# Patient Record
Sex: MALE | Race: WHITE | Employment: OTHER | URBAN - METROPOLITAN AREA
[De-identification: names, ages, dates, MRNs, and addresses within clinical notes are randomized per-mention and may not be internally consistent; named-entity substitution may affect disease eponyms.]

---

## 2020-08-12 ENCOUNTER — OFFICE VISIT (OUTPATIENT)
Dept: INFECTIOUS DISEASES | Age: 70
End: 2020-08-12
Payer: COMMERCIAL

## 2020-08-12 VITALS
RESPIRATION RATE: 18 BRPM | OXYGEN SATURATION: 99 % | HEART RATE: 66 BPM | WEIGHT: 150 LBS | DIASTOLIC BLOOD PRESSURE: 55 MMHG | HEIGHT: 66 IN | BODY MASS INDEX: 24.11 KG/M2 | SYSTOLIC BLOOD PRESSURE: 109 MMHG | TEMPERATURE: 97.5 F

## 2020-08-12 PROCEDURE — 99203 OFFICE O/P NEW LOW 30 MIN: CPT | Performed by: INTERNAL MEDICINE

## 2020-08-12 RX ORDER — ATORVASTATIN CALCIUM 80 MG/1
TABLET, FILM COATED ORAL
COMMUNITY
Start: 2020-06-24

## 2020-08-12 RX ORDER — PNV NO.95/FERROUS FUM/FOLIC AC 28MG-0.8MG
TABLET ORAL
COMMUNITY
Start: 2020-06-27

## 2020-08-12 RX ORDER — HYDROCODONE BITARTRATE AND ACETAMINOPHEN 5; 325 MG/1; MG/1
TABLET ORAL
COMMUNITY
Start: 2020-08-10

## 2020-08-12 RX ORDER — CARVEDILOL 6.25 MG/1
TABLET ORAL
COMMUNITY
Start: 2020-06-01

## 2020-08-12 RX ORDER — TICAGRELOR 90 MG/1
TABLET ORAL
COMMUNITY
Start: 2020-06-07

## 2020-08-12 RX ORDER — INSULIN DETEMIR 100 [IU]/ML
INJECTION, SOLUTION SUBCUTANEOUS
COMMUNITY
Start: 2020-06-05

## 2020-08-12 RX ORDER — VALACYCLOVIR HYDROCHLORIDE 1 G/1
TABLET, FILM COATED ORAL
COMMUNITY
Start: 2020-08-10

## 2020-08-12 RX ORDER — ISOSORBIDE MONONITRATE 30 MG/1
TABLET, EXTENDED RELEASE ORAL
COMMUNITY
Start: 2020-06-07

## 2020-08-12 RX ORDER — FUROSEMIDE 40 MG/1
TABLET ORAL
COMMUNITY
Start: 2020-06-07

## 2020-08-12 ASSESSMENT — ENCOUNTER SYMPTOMS
ALLERGIC/IMMUNOLOGIC NEGATIVE: 1
RESPIRATORY NEGATIVE: 1
GASTROINTESTINAL NEGATIVE: 1

## 2020-08-12 NOTE — PROGRESS NOTES
[facility]   Office Consult Note  Today's Date and Time: 8/12/2020, 10:15 AM    Impression:     1. Dry gangrene ([address]    2. Toe ulcer, left, with unspecified severity ([address]    3. Osteomyelitis of toe of left foot ([facility])         Recommendations   · The patient has dry gangrenous changes in the right great toe and I would agree that amputation will be the way to go. · The MRI suggest osteomyelitis in the phalanges of that toe. · The patient has a left great toe ulceration in the distal phalanx is visible in the wound bed and by definition this is osteomyelitis as well  · I have discussed with the patient that at this point in time there is no evidence of an acute infection and that the osteomyelitis that he has is related to the severe peripheral arterial disease and gangrenous changes  · The patient has dry gangrene and therefore antimicrobial therapy is not skin to be of any benefit  · I would agree with the plans for right great toe amputation and I think the patient would need at least distal phalanx amputation on the left as well  · The [facility] for surgical planning  · I will see him in on an as-needed basis if the need for antibiotics would arise post surgery if there is any secondary soft tissue infection  · By definition the osteomyelitis should be treated with the amputations alone given that the infected bone will be removed in its entirety. I have ordered the following medications/ labs:  No orders of the defined types were placed in this encounter. No orders of the defined types were placed in this encounter.       Chief complaint/reason for consultation:     Chief Complaint   Patient presents with    Osteomyelitis         History of Present Illness:   Parul Edmonds is a 72y.o.-year-old male who is seen at there request of Kenton Norris MD.  Lizeth Atkins is a 60-year-old male with a longstanding history of diabetes mellitus, peripheral arterial disease, hypertension, chronic kidney disease stage III, congestive heart failure, ischemic cardiomyopathy. 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 The patient has previously had attempts at revascularization in the right lower extremity and at this point in time the patient's disease has been deemed non-reconstructable. The patient has been at the wound healing center at [facility] getting hyperbaric oxygen treatment for limb salvage. The patient had a thick curled toenail cutting into the skin causing a wound on the tip of the left great toe that was debrided by his podiatrist and the wound has been nonhealing. The patient has dry gangrene in the great toes of both his feet and there is been a plan for him to undergo right great toe amputation and left great toe debridement. His hyperbaric oxygen treatments have been on hold until the amputations done then they will be resumed to help with wound healing. The patient did have an MRI done July 22, 2020 that showed a skin defect over the terminal tuft of the right great toe. There were changes of osteomyelitis affecting the right first distal phalanx as well as the distal aspect of the proximal phalanx. There was no evidence for septic arthritis of the interphalangeal joint. The patient was asked to see me for the osteomyelitis. The patient does also report that recently over the weekend he developed pain in the right back and abdomen and was diagnosed with shingles and he is on Valtrex and also taking an oral narcotic for pain relief. I have personally reviewed the past medical history,past surgical history, medications, social history, and family history, and I have updated the database accordingly.   PastMedical History:     Past Medical History:   Diagnosis Date    CHF (congestive heart failure) (HCC)     High blood pressure     Kidney disease     Shingles      Past Surgical History:   History reviewed. No pertinent surgical history. Medications:     Current Outpatient Medications   Medication Sig Dispense Refill    atorvastatin (LIPITOR) 80 MG tablet TAKE 1 TABLET BY MOUTH EVERY DAY      carvedilol (COREG) 6.25 MG tablet TAKE 1 TABLET BY MOUTH TWICE A DAY      Ferrous Sulfate (IRON) 325 (65 Fe) MG TABS TAKE 1 TABLET BY MOUTH TWICE DAILY FOR 30 DAYS      furosemide (LASIX) 40 MG tablet TAKE 1 TABLET BY MOUTH EVERY DAY      HYDROcodone-acetaminophen (NORCO) 5-325 MG per tablet       LEVEMIR FLEXTOUCH 100 UNIT/ML injection pen INJECT 10 UNITS SUB Q AT BEDTIME      isosorbide mononitrate (IMDUR) 30 MG extended release tablet TAKE 1 TABLET BY MOUTH EVERY DAY IN THE MORNING      BRILINTA 90 MG TABS tablet TAKE 1 TABLET BY MOUTH TWICE A DAY      valACYclovir (VALTREX) 1 g tablet        No current facility-administered medications for this visit.        Social History:     Social History     Socioeconomic History    Marital status: Unknown     Spouse name: Not on file    Number of children: Not on file    Years of education: Not on file    Highest education level: Not on file   Occupational History    Not on file   Social Needs    Financial resource strain: Not on file    Food insecurity     Worry: Not on file     Inability: Not on file    Transportation needs     Medical: Not on file     Non-medical: Not on file   Tobacco Use    Smoking status: Never Smoker    Smokeless tobacco: Never Used   Substance and Sexual Activity    Alcohol use: Not on file    Drug use: Not on file    Sexual activity: Not on file   Lifestyle    Physical activity     Days per week: Not on file     Minutes per session: Not on file    Stress: Not on file   Relationships    Social connections     Talks on phone: Not on file     Gets together: Not on file     Attends [employer] service: Not on file     Active member of club or organization: Not on file     Attends meetings of clubs or organizations: Right-sided T9 dermatome vesicular rash consistent with varicella-zoster virus  Right great toe dry gangrene as depicted in the pictures below. Left great toe ulceration at the tip with exposed bone in the wound bed   Neurological:      Mental Status: He is alert and oriented to person, place, and time. Medical Decision Making:   I haveindependently reviewed the following labs:  CBC with Differential:No results found for: WBC, HGB, HCT, PLT, SEGSPCT, BANDSPCT, LYMPHOPCT, MONOPCT, EOSPCT  BMP:No results found for: NA, K, CL, CO2, BUN, CREATININE, MG  Hepatic Function Panel: No results found for: PROT, LABALBU, BILIDIR, IBILI, BILITOT, ALKPHOS, ALT, AST  No results found for: CRP  No results found for: SEDRATE      Imaging Studies:   MRI results as noted above in the HPI    Thank you for allowing us to participate in the care of this patient. Pleasecall with questions. Dhiraj Turner MD  Perfect [employer] messaging: [phone]    This note is created with the assistance of a speech recognition program.  While intending to generate a document that actually reflects the content ofthe visit, the document can still have some errors including those of syntax and sound a like substitutions which may escape proof reading. It such instances, actual meaning can be extrapolated by contextual diversion.

## 2020-08-17 ENCOUNTER — TELEPHONE (OUTPATIENT)
Dept: INFECTIOUS DISEASES | Age: 70
End: 2020-08-17

## 2021-01-11 ENCOUNTER — HOSPITAL ENCOUNTER (OUTPATIENT)
Age: 71
Setting detail: SPECIMEN
Discharge: HOME | End: 2021-01-11
Payer: MEDICARE

## 2021-01-11 LAB
ABSOLUTE EOS #: 0.14 K/UL (ref 0–0.44)
ABSOLUTE IMMATURE GRANULOCYTE: <0.03 K/UL (ref 0–0.3)
ABSOLUTE LYMPH #: 1.21 K/UL (ref 1.1–3.7)
ABSOLUTE MONO #: 0.46 K/UL (ref 0.1–1.2)
ANION GAP SERPL CALCULATED.3IONS-SCNC: 11 MMOL/L (ref 9–17)
BASOPHILS # BLD: 1 % (ref 0–2)
BASOPHILS ABSOLUTE: 0.04 K/UL (ref 0–0.2)
BUN BLDV-MCNC: 59 MG/DL (ref 8–23)
BUN/CREAT BLD: ABNORMAL (ref 9–20)
CALCIUM SERPL-MCNC: 9.1 MG/DL (ref 8.6–10.4)
CHLORIDE BLD-SCNC: 96 MMOL/L (ref 98–107)
CO2: 28 MMOL/L (ref 20–31)
CREAT SERPL-MCNC: 1.58 MG/DL (ref 0.7–1.2)
DIFFERENTIAL TYPE: ABNORMAL
EOSINOPHILS RELATIVE PERCENT: 2 % (ref 1–4)
GFR AFRICAN AMERICAN: 53 ML/MIN
GFR NON-AFRICAN AMERICAN: 44 ML/MIN
GFR SERPL CREATININE-BSD FRML MDRD: ABNORMAL ML/MIN/{1.73_M2}
GFR SERPL CREATININE-BSD FRML MDRD: ABNORMAL ML/MIN/{1.73_M2}
GLUCOSE BLD-MCNC: 183 MG/DL (ref 70–99)
HCT VFR BLD CALC: 27 % (ref 40.7–50.3)
HEMOGLOBIN: 8.5 G/DL (ref 13–17)
IMMATURE GRANULOCYTES: 0 %
LYMPHOCYTES # BLD: 16 % (ref 24–43)
MAGNESIUM: 2.2 MG/DL (ref 1.6–2.6)
MCH RBC QN AUTO: 30.6 PG (ref 25.2–33.5)
MCHC RBC AUTO-ENTMCNC: 31.5 G/DL (ref 28.4–34.8)
MCV RBC AUTO: 97.1 FL (ref 82.6–102.9)
MONOCYTES # BLD: 6 % (ref 3–12)
NRBC AUTOMATED: 0 PER 100 WBC
PDW BLD-RTO: 17 % (ref 11.8–14.4)
PLATELET # BLD: 225 K/UL (ref 138–453)
PLATELET ESTIMATE: ABNORMAL
PMV BLD AUTO: 11.4 FL (ref 8.1–13.5)
POTASSIUM SERPL-SCNC: 4.6 MMOL/L (ref 3.7–5.3)
RBC # BLD: 2.78 M/UL (ref 4.21–5.77)
RBC # BLD: ABNORMAL 10*6/UL
SEG NEUTROPHILS: 75 % (ref 36–65)
SEGMENTED NEUTROPHILS ABSOLUTE COUNT: 5.92 K/UL (ref 1.5–8.1)
SODIUM BLD-SCNC: 135 MMOL/L (ref 135–144)
WBC # BLD: 7.8 K/UL (ref 3.5–11.3)
WBC # BLD: ABNORMAL 10*3/UL

## 2021-02-18 ENCOUNTER — HOSPITAL ENCOUNTER (EMERGENCY)
Age: 71
Discharge: HOME | End: 2021-02-18
Attending: EMERGENCY MEDICINE
Payer: MEDICARE

## 2021-02-18 VITALS
HEART RATE: 92 BPM | SYSTOLIC BLOOD PRESSURE: 175 MMHG | DIASTOLIC BLOOD PRESSURE: 78 MMHG | TEMPERATURE: 98.4 F | OXYGEN SATURATION: 100 % | RESPIRATION RATE: 16 BRPM | WEIGHT: 135 LBS | BODY MASS INDEX: 21.79 KG/M2

## 2021-02-18 DIAGNOSIS — T87.9 COMPLICATION OF AMPUTATION STUMP OF LEFT LOWER EXTREMITY (HCC): Primary | ICD-10-CM

## 2021-02-18 DIAGNOSIS — D64.89 OTHER SPECIFIED ANEMIAS: ICD-10-CM

## 2021-02-18 LAB
ABSOLUTE EOS #: 0.11 K/UL (ref 0–0.44)
ABSOLUTE IMMATURE GRANULOCYTE: 0.14 K/UL (ref 0–0.3)
ABSOLUTE LYMPH #: 1.56 K/UL (ref 1.1–3.7)
ABSOLUTE MONO #: 0.62 K/UL (ref 0.1–1.2)
BASOPHILS # BLD: 0 % (ref 0–2)
BASOPHILS ABSOLUTE: 0.05 K/UL (ref 0–0.2)
DIFFERENTIAL TYPE: ABNORMAL
EOSINOPHILS RELATIVE PERCENT: 1 % (ref 1–4)
HCT VFR BLD CALC: 20.6 % (ref 40.7–50.3)
HCT VFR BLD CALC: 24.9 % (ref 40.7–50.3)
HEMOGLOBIN: 6.8 G/DL (ref 13–17)
HEMOGLOBIN: 8.3 G/DL (ref 13–17)
IMMATURE GRANULOCYTES: 1 %
LYMPHOCYTES # BLD: 13 % (ref 24–43)
MCH RBC QN AUTO: 31.5 PG (ref 25.2–33.5)
MCHC RBC AUTO-ENTMCNC: 33 G/DL (ref 28.4–34.8)
MCV RBC AUTO: 95.4 FL (ref 82.6–102.9)
MONOCYTES # BLD: 5 % (ref 3–12)
NRBC AUTOMATED: 0 PER 100 WBC
PDW BLD-RTO: 13.3 % (ref 11.8–14.4)
PLATELET # BLD: 378 K/UL (ref 138–453)
PLATELET ESTIMATE: ABNORMAL
PMV BLD AUTO: 8.8 FL (ref 8.1–13.5)
RBC # BLD: 2.16 M/UL (ref 4.21–5.77)
RBC # BLD: ABNORMAL 10*6/UL
SEG NEUTROPHILS: 79 % (ref 36–65)
SEGMENTED NEUTROPHILS ABSOLUTE COUNT: 9.24 K/UL (ref 1.5–8.1)
WBC # BLD: 11.7 K/UL (ref 3.5–11.3)
WBC # BLD: ABNORMAL 10*3/UL

## 2021-02-18 PROCEDURE — 86850 RBC ANTIBODY SCREEN: CPT

## 2021-02-18 PROCEDURE — 99283 EMERGENCY DEPT VISIT LOW MDM: CPT

## 2021-02-18 PROCEDURE — 96375 TX/PRO/DX INJ NEW DRUG ADDON: CPT

## 2021-02-18 PROCEDURE — 85018 HEMOGLOBIN: CPT

## 2021-02-18 PROCEDURE — 85014 HEMATOCRIT: CPT

## 2021-02-18 PROCEDURE — 85025 COMPLETE CBC W/AUTO DIFF WBC: CPT

## 2021-02-18 PROCEDURE — 6360000002 HC RX W HCPCS: Performed by: STUDENT IN AN ORGANIZED HEALTH CARE EDUCATION/TRAINING PROGRAM

## 2021-02-18 PROCEDURE — 96374 THER/PROPH/DIAG INJ IV PUSH: CPT

## 2021-02-18 PROCEDURE — 36430 TRANSFUSION BLD/BLD COMPNT: CPT

## 2021-02-18 PROCEDURE — 86901 BLOOD TYPING SEROLOGIC RH(D): CPT

## 2021-02-18 PROCEDURE — 86900 BLOOD TYPING SEROLOGIC ABO: CPT

## 2021-02-18 PROCEDURE — 2500000003 HC RX 250 WO HCPCS: Performed by: STUDENT IN AN ORGANIZED HEALTH CARE EDUCATION/TRAINING PROGRAM

## 2021-02-18 PROCEDURE — P9016 RBC LEUKOCYTES REDUCED: HCPCS

## 2021-02-18 PROCEDURE — 86920 COMPATIBILITY TEST SPIN: CPT

## 2021-02-18 PROCEDURE — 96376 TX/PRO/DX INJ SAME DRUG ADON: CPT

## 2021-02-18 RX ORDER — FENTANYL CITRATE 50 UG/ML
50 INJECTION, SOLUTION INTRAMUSCULAR; INTRAVENOUS PRN
Status: COMPLETED | OUTPATIENT
Start: 2021-02-18 | End: 2021-02-18

## 2021-02-18 RX ORDER — FENTANYL CITRATE 50 UG/ML
50 INJECTION, SOLUTION INTRAMUSCULAR; INTRAVENOUS ONCE
Status: COMPLETED | OUTPATIENT
Start: 2021-02-18 | End: 2021-02-18

## 2021-02-18 RX ORDER — MORPHINE SULFATE 4 MG/ML
4 INJECTION, SOLUTION INTRAMUSCULAR; INTRAVENOUS ONCE
Status: COMPLETED | OUTPATIENT
Start: 2021-02-18 | End: 2021-02-18

## 2021-02-18 RX ORDER — LIDOCAINE HYDROCHLORIDE 10 MG/ML
30 INJECTION, SOLUTION EPIDURAL; INFILTRATION; INTRACAUDAL; PERINEURAL ONCE
Status: COMPLETED | OUTPATIENT
Start: 2021-02-18 | End: 2021-02-18

## 2021-02-18 RX ORDER — SODIUM CHLORIDE 9 MG/ML
INJECTION, SOLUTION INTRAVENOUS PRN
Status: DISCONTINUED | OUTPATIENT
Start: 2021-02-18 | End: 2021-02-18 | Stop reason: HOSPADM

## 2021-02-18 RX ADMIN — LIDOCAINE HYDROCHLORIDE 30 ML: 10 INJECTION, SOLUTION EPIDURAL; INFILTRATION; INTRACAUDAL; PERINEURAL at 13:25

## 2021-02-18 RX ADMIN — FENTANYL CITRATE 50 MCG: 50 INJECTION, SOLUTION INTRAMUSCULAR; INTRAVENOUS at 16:00

## 2021-02-18 RX ADMIN — FENTANYL CITRATE 50 MCG: 50 INJECTION, SOLUTION INTRAMUSCULAR; INTRAVENOUS at 14:52

## 2021-02-18 RX ADMIN — FENTANYL CITRATE 50 MCG: 50 INJECTION, SOLUTION INTRAMUSCULAR; INTRAVENOUS at 14:39

## 2021-02-18 RX ADMIN — MORPHINE SULFATE 4 MG: 4 INJECTION INTRAVENOUS at 16:14

## 2021-02-18 RX ADMIN — FENTANYL CITRATE 50 MCG: 50 INJECTION, SOLUTION INTRAMUSCULAR; INTRAVENOUS at 13:24

## 2021-02-18 ASSESSMENT — ENCOUNTER SYMPTOMS
ABDOMINAL PAIN: 0
SHORTNESS OF BREATH: 0
BACK PAIN: 0

## 2021-02-18 ASSESSMENT — PAIN SCALES - GENERAL
PAINLEVEL_OUTOF10: 7
PAINLEVEL_OUTOF10: 7

## 2021-02-18 NOTE — ED NOTES
Dr. Gallego Adas at bedside to repair wound on patients left leg amputation site.       Chris Harrington RN  02/18/21 3060

## 2021-02-18 NOTE — CONSULTS
[address]      Patient's Name/ Date of Birth/ Gender: Zunilda Sung. / 1950 (72 y.o.) / male     Referring Physician: Lauren Ramirez MD    Consulting Physician: Dr. Carmine Faith    History of present Illness: Pt is a 72 y.o. male who presents to [facility]. [facility] ED today with persistent bleeding to left stump site post-fall. Patient denies hitting his head or losing consciousness during the fall. Patient has history of b/l BKA amputations. Patient states that two days ago he fell onto his stumps while transferring which caused the left stump to open. Patient's wife states the left stump has sero-sanguinous drainage that has not stopped since the fall. Patient denies any purulent drainage from either stump. Patient reports he is diabetic and is taking daily Aspirin & Brilinta. Past Medical History:  has a past medical history of CHF (congestive heart failure) ([address] High blood pressure, Kidney disease, and Shingles. Past Surgical History: No past surgical history on file. Social History:  reports that he has never smoked. He has never used smokeless tobacco.    Family History: family history is not on file. Allergies: Patient has no known allergies.     Current Meds:  Current Facility-Administered Medications:     0.9 % sodium chloride infusion, , Intravenous, PRN, Man Uribe MD    Current Outpatient Medications:     atorvastatin (LIPITOR) 80 MG tablet, TAKE 1 TABLET BY MOUTH EVERY DAY, Disp: , Rfl:     carvedilol (COREG) 6.25 MG tablet, TAKE 1 TABLET BY MOUTH TWICE A DAY, Disp: , Rfl:     Ferrous Sulfate (IRON) 325 (65 Fe) MG TABS, TAKE 1 TABLET BY MOUTH TWICE DAILY FOR 30 DAYS, Disp: , Rfl:     furosemide (LASIX) 40 MG tablet, TAKE 1 TABLET BY MOUTH EVERY DAY, Disp: , Rfl:     HYDROcodone-acetaminophen (NORCO) 5-325 MG per tablet, , Disp: , Rfl:     LEVEMIR FLEXTOUCH 100 UNIT/ML injection pen, INJECT 10 UNITS SUB Q AT BEDTIME, Disp: , Rfl:   isosorbide mononitrate (IMDUR) 30 MG extended release tablet, TAKE 1 TABLET BY MOUTH EVERY DAY IN THE MORNING, Disp: , Rfl:     BRILINTA 90 MG TABS tablet, TAKE 1 TABLET BY MOUTH TWICE A DAY, Disp: , Rfl:     valACYclovir (VALTREX) 1 g tablet, , Disp: , Rfl:     REVIEW OF SYSTEMS:      Review of Systems - General ROS: negative for - chills, fatigue, fever or night sweats  Psychological ROS: negative for - anxiety, behavioral disorder or depression  Ophthalmic ROS: negative for - blurry vision, dry eyes or eye pain  ENT ROS: negative for - epistaxis, headaches or nasal congestion  Hematological and Lymphatic ROS:  negative for - bleeding problems, blood clots or bruising  Endocrine ROS: negative for - malaise/lethargy, mood swings or palpitations  Respiratory ROS: negative for - cough, hemoptysis or shortness of breath  Cardiovascular ROS: no chest pain or dyspnea on exertion  Gastrointestinal ROS: no abdominal pain, constipation, hematochezia  Genito-Urinary ROS: no dysuria, trouble voiding, or hematuria  Musculoskeletal ROS: negative for - joint pain, joint swelling. Positive for muscle pain to LLE. Positive for open wound to LLE.    Neurological ROS: Negative for TIA or stroke like symptom  Dermatological ROS: negative for dry skin and eczema    PHYSICAL EXAM:    VITALS:  BP (!) 149/68   Pulse 75   Temp 98.6 °F (37 °C) (Oral)   Resp 14   Wt 135 lb (61.2 kg)   SpO2 100%   BMI 21.79 kg/m²   INTAKE/OUTPUT:   No intake or output data in the 24 hours ending 02/18/21 1432      CONSTITUTIONAL:  Alert and oriented, no acute distress  HEAD: normocephalic, atraumatic  EYES: Pupils equal and reactive to light, Extraocular muscles intact, sclera non icteric  ENT: Mucus membranes moist, No otorrhea, no rhinorrhea  NECK:  supple, symmetrical, trachea midline   LUNGS:  Good air movement bilaterally, unlabored respirations, no wheezes or rhonchi  CARDIOVASCULAR: Regular rate and rhythm ABDOMEN: soft, non tender, non distended, no rebound or guarding  : Deferred   Rectal:  Deferred   MUSCULOSKELETAL:  Equal strength bilaterally. S/p b/l BKA  SKIN: No abscess or rash. Open wound with exposed tibia to left stump. Ext: Palpable popliteal pulse b/l   NEUROLOGIC: no focal deficits  PSYCH: affect appropriate      Labs:   Lab Results   Component Value Date    WBC 11.7 02/18/2021    HGB 6.8 02/18/2021    HCT 20.6 02/18/2021    MCV 95.4 02/18/2021     02/18/2021     Lab Results   Component Value Date     01/11/2021    K 4.6 01/11/2021    CL 96 01/11/2021    CO2 28 01/11/2021    BUN 59 01/11/2021    CREATININE 1.58 01/11/2021    GLUCOSE 183 01/11/2021    CALCIUM 9.1 01/11/2021     No results found for: INR      Impression:  There is no problem list on file for this patient. 1. 72 y.o. male with hx of b/l BKA and open wound with exposed tibia to the left stump site     Recommendation:    1. Recommend blood transfusion for Hgb of 6.8   2. Bedside washout and closure of wound, see procedure note below. 3. Patient to perform daily dressing changes as directed until follow up with Dr. Jade Martinez  4. Patient okay to be discharged from vascular standpoint. 5. Follow up with Dr. Jade Martinez 2/22/2021. Procedure Note:   Patient understands that a bedside wound washout and closure needs to be performed in the left lower extremity. Patient agrees to procedure. Timeout performed with patient, RN, and writer in room. Attention was then directed to the LLE. Copious amounts of betadine/normal saline mixture was used to irrigate the wound site. 15cc of 1% lidocaine plain was used for a local block. The wound site was then draped in sterile fashion. The open stump site was then sutured closed in layered fashion from deep to superficial being sure to cover the exposed bone. Upon completion satisfactory closure was achieved. No purulence was noted throughout procedure. The site was then scrubbed with hibiclens and dressed with adaptic, 4x4s, Kerlix, and Ace. Hemostasis was achieved with direct pressure. There were no complications and patient reports 0/10 post procedure pain. Patient tolerated procedure well.      Electronically signed by Stewart Pennington DPM on 2/18/2021 at 4:37 PM

## 2021-02-18 NOTE — ED PROVIDER NOTES
[facility] ED  Emergency Department Encounter  Emergency Medicine Resident     Pt Name: Nichole Reid MRN: [id number]  Birthdate 1950  Date of evaluation: 2/18/21  PCP:  Lyric Ha MD    [address] [place] [address]       Chief Complaint   Patient presents with   Emerita Abler Fall     pt fell two days ago, pt hit his stump on LLE. pt with ace bandage in place at this time. pts family member states that its been bleeding since yesterday. pt denies hitting head when he fell. pt alert and oriented at this time. pt was told by his physician to come to er. pt is on aspirin and brilinta. HISTORY OFPRESENT ILLNESS  (Location/Symptom, Timing/Onset, Context/Setting, Quality, Duration, Modifying Factors,Severity.)      Nichole Reid is a 72year old male, PMH CHF, hypertension,  who presents with bleeding from his left stump. The patient had a fall 2 days ago while transitioning from his wheelchair to his bed. Patient fell forward and struck his left stump on the ground. Did not hit his head or lose consciousness. The patient has had persistent bleeding from his left stump. He was evaluated at outlying emergency department yesterday and vascular surgery was consulted. The patient had an x-ray as well. Patient was discharged home. Presents again today for persistent bleeding. The patient has home nurses who changed bandages at home. The patient takes Brilinta and aspirin. Last dose of Brilinta and aspirin this morning around 9 AM.    PAST MEDICAL / SURGICAL / SOCIAL / FAMILY HISTORY      has a past medical history of CHF (congestive heart failure) ([address] High blood pressure, Kidney disease, and Shingles. has no past surgical history on file.      Social History     Socioeconomic History    Marital status:      Spouse name: Not on file    Number of children: Not on file    Years of education: Not on file    Highest education level: Not on file   Occupational History  Not on file   Social Needs    Financial resource strain: Not on file    Food insecurity     Worry: Not on file     Inability: Not on file    Transportation needs     Medical: Not on file     Non-medical: Not on file   Tobacco Use    Smoking status: Never Smoker    Smokeless tobacco: Never Used   Substance and Sexual Activity    Alcohol use: Not on file    Drug use: Not on file    Sexual activity: Not on file   Lifestyle    Physical activity     Days per week: Not on file     Minutes per session: Not on file    Stress: Not on file   Relationships    Social connections     Talks on phone: Not on file     Gets together: Not on file     Attends [employer] service: Not on file     Active member of club or organization: Not on file     Attends meetings of clubs or organizations: Not on file     Relationship status: Not on file    Intimate partner violence     Fear of current or ex partner: Not on file     Emotionally abused: Not on file     Physically abused: Not on file     Forced sexual activity: Not on file   Other Topics Concern    Not on file   Social History Narrative    Not on file       No family history on file. Allergies:  Patient has no known allergies. Home Medications:  Prior to Admission medications    Medication Sig Start Date End Date Taking?  Authorizing Provider   atorvastatin (LIPITOR) 80 MG tablet TAKE 1 TABLET BY MOUTH EVERY DAY 6/24/20   Historical Provider, MD   carvedilol (COREG) 6.25 MG tablet TAKE 1 TABLET BY MOUTH TWICE A DAY 6/1/20   Historical Provider, MD   Ferrous Sulfate (IRON) 325 (65 Fe) MG TABS TAKE 1 TABLET BY MOUTH TWICE DAILY FOR 30 DAYS 6/27/20   Historical Provider, MD   furosemide (LASIX) 40 MG tablet TAKE 1 TABLET BY MOUTH EVERY DAY 6/7/20   Historical Provider, MD   HYDROcodone-acetaminophen ([facility]) 5-325 MG per tablet  8/10/20   Historical Provider, MD No results found. EKG      All EKG's are interpreted by the Emergency Department Physicianwho either signs or Co-signs this chart in the absence of a cardiologist.    EMERGENCY DEPARTMENT COURSE:      Hemoglobin 6.8. Will transfuse 1 unit of blood. Vascular surgery to irrigate and repair wound bedside. Vascular surgery agreeable to discharge at this time and follow-up on Monday. Will repeat hemoglobin after 1 unit. Plan to discharge if improved from 6.8. Hemoglobin improved to 8.3 after 1 unit of blood. .  The patient was instructed to follow-up with vascular surgery. PROCEDURES:  None    CONSULTS:  IP CONSULT TO VASCULAR SURGERY    CRITICAL CARE:  Please see attending note    FINAL IMPRESSION      1. Complication of amputation stump of left lower extremity (HCC)          DISPOSITION / PLAN     DISPOSITION        PATIENT REFERRED TO:  No follow-up provider specified.     DISCHARGE MEDICATIONS:  New Prescriptions    No medications on file       Nayely Rosado MD  Emergency Medicine Resident    (Please note that portions of this note were completed with a voice recognition program.Efforts were made to edit the dictations but occasionally words are mis-transcribed.)        Nayely Rosado MD  Resident  02/18/21 [id number]       Nayely Rosado MD  Resident  02/18/21 [id number]

## 2021-02-18 NOTE — ED PROVIDER NOTES
[facility] [address] ED     Emergency Department     Faculty Attestation        I performed a history and physical examination of the patient and discussed management with the resident. I reviewed the residents note and agree with the documented findings and plan of care. Any areas of disagreement are noted on the chart. I was personally present for the key portions of any procedures. I have documented in the chart those procedures where I was not present during the key portions. I have reviewed the emergency nurses triage note. I agree with the chief complaint, past medical history, past surgical history, allergies, medications, social and family history as documented unless otherwise noted below. For Physician Assistant/ Nurse Practitioner cases/documentation I have personally evaluated this patient and have completed at least one if not all key elements of the E/M (history, physical exam, and MDM). Additional findings are as noted. Vital Signs: BP: (!) 136/58  Pulse: 84  Resp: 14  Temp: 99 °F (37.2 °C) SpO2: 100 %  PCP:  Laura Campbell MD    Pertinent Comments:     Patient is a 50-year-old male with bilateral lower extremity amputations. Patient fell yesterday onto his left 1 with ruptured incision line and intermittent oozing and bleeding. Here appears to have large wound that has been reopened. Some mild oozing. No obvious signs of infection at this time. Assessment/plan: Reopening of incision for previous amputation and well obtain CBC given baseline hemoglobin 8.5 and significant bleeding yesterday.    Also consult vascular surgery who perform the procedure    Critical Care  None This patient was evaluated in the Emergency Department for symptoms described in the history of present illness. He/she was evaluated in the context of the global COVID-19 pandemic, which necessitated consideration that the patient might be at risk for infection with the SARS-CoV-2 virus that causes COVID-19. Institutional protocols and algorithms that pertain to the evaluation of patients at risk for COVID-19 are in a state of rapid change based on information released by regulatory bodies including the CDC and federal and state organizations. These policies and algorithms were followed during the patient's care in the ED. (Please note that portions of this note were completed with a voice recognition program. Efforts were made to edit the dictations but occasionally words are mis-transcribed.  Whenever words are used in this note in any gender, they shall be construed as though they were used in the gender appropriate to the circumstances; and whenever words are used in this note in the singular or plural form, they shall be construed as though they were used in the form appropriate to the circumstances.)    Jose Del aVlle MD [facility]  Attending Emergency Medicine Physician           Fish Chandler MD  02/18/21 [address] MATHEWS MD  02/18/21 [id number]

## 2021-02-19 LAB
ABO/RH: NORMAL
ANTIBODY SCREEN: NEGATIVE
ARM BAND NUMBER: NORMAL
BLD PROD TYP BPU: NORMAL
CROSSMATCH RESULT: NORMAL
DISPENSE STATUS BLOOD BANK: NORMAL
EXPIRATION DATE: NORMAL
TRANSFUSION STATUS: NORMAL
UNIT DIVISION: 0
UNIT NUMBER: NORMAL

## 2023-04-24 ENCOUNTER — HOSPITAL ENCOUNTER (OUTPATIENT)
Dept: ONCOLOGY | Age: 73
Discharge: HOME | End: 2023-04-24

## 2023-06-20 ENCOUNTER — OFFICE VISIT (OUTPATIENT)
Age: 73
End: 2023-06-20
Payer: MEDICARE

## 2023-06-20 VITALS
SYSTOLIC BLOOD PRESSURE: 137 MMHG | WEIGHT: 154.7 LBS | HEIGHT: 62 IN | TEMPERATURE: 97.5 F | BODY MASS INDEX: 28.47 KG/M2 | DIASTOLIC BLOOD PRESSURE: 59 MMHG | HEART RATE: 73 BPM | RESPIRATION RATE: 14 BRPM

## 2023-06-20 VITALS — WEIGHT: 155 LBS | BODY MASS INDEX: 25.02 KG/M2

## 2023-06-20 DIAGNOSIS — Z89.511 HX OF BKA, RIGHT (HCC): ICD-10-CM

## 2023-06-20 DIAGNOSIS — Z78.9 NONSMOKER: ICD-10-CM

## 2023-06-20 DIAGNOSIS — Z89.512 HX OF BKA, LEFT (HCC): ICD-10-CM

## 2023-06-20 DIAGNOSIS — N18.5 TYPE 2 DIABETES MELLITUS WITH STAGE 5 CHRONIC KIDNEY DISEASE NOT ON CHRONIC DIALYSIS, WITH LONG-TERM CURRENT USE OF INSULIN (HCC): Primary | ICD-10-CM

## 2023-06-20 DIAGNOSIS — N18.5 CKD STAGE 5 DUE TO TYPE 2 DIABETES MELLITUS (HCC): ICD-10-CM

## 2023-06-20 DIAGNOSIS — I10 PRIMARY HYPERTENSION: ICD-10-CM

## 2023-06-20 DIAGNOSIS — E11.22 TYPE 2 DIABETES MELLITUS WITH STAGE 5 CHRONIC KIDNEY DISEASE NOT ON CHRONIC DIALYSIS, WITH LONG-TERM CURRENT USE OF INSULIN (HCC): Primary | ICD-10-CM

## 2023-06-20 DIAGNOSIS — E78.2 MIXED HYPERLIPIDEMIA: ICD-10-CM

## 2023-06-20 DIAGNOSIS — Z79.4 TYPE 2 DIABETES MELLITUS WITH STAGE 5 CHRONIC KIDNEY DISEASE NOT ON CHRONIC DIALYSIS, WITH LONG-TERM CURRENT USE OF INSULIN (HCC): Primary | ICD-10-CM

## 2023-06-20 DIAGNOSIS — E11.22 CKD STAGE 5 DUE TO TYPE 2 DIABETES MELLITUS (HCC): ICD-10-CM

## 2023-06-20 PROCEDURE — 1123F ACP DISCUSS/DSCN MKR DOCD: CPT | Performed by: STUDENT IN AN ORGANIZED HEALTH CARE EDUCATION/TRAINING PROGRAM

## 2023-06-20 PROCEDURE — 3074F SYST BP LT 130 MM HG: CPT | Performed by: STUDENT IN AN ORGANIZED HEALTH CARE EDUCATION/TRAINING PROGRAM

## 2023-06-20 PROCEDURE — 3078F DIAST BP <80 MM HG: CPT | Performed by: STUDENT IN AN ORGANIZED HEALTH CARE EDUCATION/TRAINING PROGRAM

## 2023-06-20 PROCEDURE — 99214 OFFICE O/P EST MOD 30 MIN: CPT | Performed by: STUDENT IN AN ORGANIZED HEALTH CARE EDUCATION/TRAINING PROGRAM

## 2023-06-20 RX ORDER — SODIUM BICARBONATE 650 MG/1
650 TABLET ORAL
COMMUNITY
Start: 2020-12-10

## 2023-06-20 RX ORDER — LISINOPRIL 5 MG/1
TABLET ORAL
COMMUNITY
Start: 2023-04-13

## 2023-06-20 RX ORDER — CALCITRIOL 0.25 UG/1
CAPSULE, LIQUID FILLED ORAL
COMMUNITY
Start: 2023-06-11

## 2023-06-20 RX ORDER — BLOOD SUGAR DIAGNOSTIC
STRIP MISCELLANEOUS
COMMUNITY
Start: 2023-05-23

## 2023-06-20 RX ORDER — POTASSIUM CHLORIDE 750 MG/1
CAPSULE, EXTENDED RELEASE ORAL
COMMUNITY
Start: 2021-02-17

## 2023-06-20 RX ORDER — ASPIRIN 81 MG/1
TABLET ORAL
COMMUNITY

## 2023-06-20 SDOH — ECONOMIC STABILITY: INCOME INSECURITY: HOW HARD IS IT FOR YOU TO PAY FOR THE VERY BASICS LIKE FOOD, HOUSING, MEDICAL CARE, AND HEATING?: VERY HARD

## 2023-06-20 SDOH — ECONOMIC STABILITY: FOOD INSECURITY: WITHIN THE PAST 12 MONTHS, YOU WORRIED THAT YOUR FOOD WOULD RUN OUT BEFORE YOU GOT MONEY TO BUY MORE.: NEVER TRUE

## 2023-06-20 SDOH — ECONOMIC STABILITY: FOOD INSECURITY: WITHIN THE PAST 12 MONTHS, THE FOOD YOU BOUGHT JUST DIDN'T LAST AND YOU DIDN'T HAVE MONEY TO GET MORE.: NEVER TRUE

## 2023-06-20 SDOH — ECONOMIC STABILITY: HOUSING INSECURITY
IN THE LAST 12 MONTHS, WAS THERE A TIME WHEN YOU DID NOT HAVE A STEADY PLACE TO SLEEP OR SLEPT IN A SHELTER (INCLUDING NOW)?: NO

## 2023-06-20 ASSESSMENT — PATIENT HEALTH QUESTIONNAIRE - PHQ9
SUM OF ALL RESPONSES TO PHQ9 QUESTIONS 1 & 2: 0
2. FEELING DOWN, DEPRESSED OR HOPELESS: 0
1. LITTLE INTEREST OR PLEASURE IN DOING THINGS: 0
SUM OF ALL RESPONSES TO PHQ QUESTIONS 1-9: 0

## 2023-06-20 NOTE — PATIENT INSTRUCTIONS
Thank you for coming in today! I ordered labs for you to do so that you can do those with the ones your nephrologist ordered for you at the same time  Continue taking your medications, you are doing a great job! You can follow-up in about 3 months for your routine visit with Dr. Temitope Celis  I am so proud of you and everything that you have accomplished. You have come a long way and you have taught me so much! Thank you for letting me take care of you these past 3 years! If you have any questions or concerns, please call our office.    Thanks! -Dr. Renzo Portillo

## 2023-06-20 NOTE — PROGRESS NOTES
[address] [facility] Medicine Residency  [address] [facility]  [address]ada Escobar [facility] [address]  Phone: [phone]  Fax: [phone]    Subjective:      Lorenza Emanuel is a 68 y.o. male with Hx of  has a past medical history of CHF (congestive heart failure) ([facility] [address] High blood pressure, Kidney disease, and Shingles. Patient is here for a diabetes follow up. Meds -Farxiga 10 mg daily, Levemir 8 units nightly, compliant and tolerating well without side effects. Home blood sugar records: fasting range: 105-115  Any episodes of hypoglycemia? no  Fluctuating blood sugars?no  Weight trend: stable  Current diet: in general, a \"healthy\" diet    Current exercise: walking and yard work  - Patient on a statin:, Atorvastatin 80 mg  Known diabetic complications: nephropathy    Hgb A1c - No results found for: LABA1C  - B12 level? - Maximum metformin? BP -   BP Readings from Last 1 Encounters:   06/20/23 (!) 137/59     Lipid Panel - No results found for: HDL, LDLDIRECT, LDLCALC, TRIG  Albumin to creatinine Ratio - 1528.4  Last creatinine: 4.01  Is He on ACE inhibitor or angiotensin II receptor blocker? Yes  Is He on Aspirin? Yes  Eye exam current (within one year): yes  Foot exam current (within one year):  n/a patient with bilateral lower extremity amputation below the knee  Is He Smoker? No  Did He receive influenza vaccine within the last 12 months? Yes  Did He receive Pneumococcal vaccine? Yes      Review of Systems  General: Denies fatigue, fever, chills  Cardiovascular: Denies chest pain, lower extremity edema, palpitations  Respiratory: Denies cough, shortness of breath at rest, shortness of breath with exertion, wheezing  GI: Denies abdominal pain, constipation, diarrhea, nausea, vomiting  MSK: Denies back pain, arthralgia  Neuro: Denies dizziness, headache, numbness or tingling    No family history on file.     Social History     Socioeconomic History    Marital status:      Spouse

## 2023-06-21 LAB
AVERAGE GLUCOSE: 150
CHOLESTEROL, TOTAL: 97 MG/DL
CHOLESTEROL/HDL RATIO: 2.4
HBA1C MFR BLD: 58 %
HDLC SERPL-MCNC: 41 MG/DL (ref 35–70)
LDL CHOLESTEROL CALCULATED: 33 MG/DL (ref 0–160)
NONHDLC SERPL-MCNC: NORMAL MG/DL
TRIGL SERPL-MCNC: 117 MG/DL
VLDLC SERPL CALC-MCNC: 23 MG/DL

## 2023-06-27 DIAGNOSIS — N18.5 TYPE 2 DIABETES MELLITUS WITH STAGE 5 CHRONIC KIDNEY DISEASE NOT ON CHRONIC DIALYSIS, WITH LONG-TERM CURRENT USE OF INSULIN (HCC): ICD-10-CM

## 2023-06-27 DIAGNOSIS — Z79.4 TYPE 2 DIABETES MELLITUS WITH STAGE 5 CHRONIC KIDNEY DISEASE NOT ON CHRONIC DIALYSIS, WITH LONG-TERM CURRENT USE OF INSULIN (HCC): ICD-10-CM

## 2023-06-27 DIAGNOSIS — E78.2 MIXED HYPERLIPIDEMIA: ICD-10-CM

## 2023-06-27 DIAGNOSIS — E11.22 TYPE 2 DIABETES MELLITUS WITH STAGE 5 CHRONIC KIDNEY DISEASE NOT ON CHRONIC DIALYSIS, WITH LONG-TERM CURRENT USE OF INSULIN (HCC): ICD-10-CM

## 2023-07-11 ENCOUNTER — TELEPHONE (OUTPATIENT)
Age: 73
End: 2023-07-11

## 2023-07-11 NOTE — TELEPHONE ENCOUNTER
Novofine needles from patient assistance received- 2 boxes 32G tip (100 tips per box)  Left message for patient to come to office for pickup. [place] in med room cupboard.

## 2023-07-13 LAB
BASOPHILS # BLD: 0.03 X10^3UL
EOSINOPHIL # BLD: 0.22 X10^3UL
ERYTHROCYTE [DISTWIDTH] IN BLOOD BY AUTOMATED COUNT: 46.7 FL
HCT VFR BLD CALC: 26.1 %
HEMOGLOBIN: 8.8 G/DL
IMMATURE GRANULOCYTES: 0.02 X10^3UL
LYMPHOCYTES # BLD: 1.23 X10^3UL
MCH RBC QN AUTO: 31.9 PG
MCHC RBC AUTO-ENTMCNC: 33.7 G/DL
MCV RBC AUTO: 94.6 FL
MONOCYTES # BLD: 0.43 X10^3UL
NEUTROPHILS: 5.24 X10^3UL
PLATELETS: 151 X10^3UL
RBC: 2.76 X10^6UL
WBC: 7.17 X10^3UL

## 2023-07-18 PROBLEM — I50.9 CHF (CONGESTIVE HEART FAILURE) (HCC): Status: ACTIVE | Noted: 2023-07-18

## 2023-07-18 PROBLEM — R80.1 PERSISTENT PROTEINURIA: Status: ACTIVE | Noted: 2023-07-18

## 2023-07-18 PROBLEM — N25.81 SECONDARY HYPERPARATHYROIDISM OF RENAL ORIGIN (HCC): Status: ACTIVE | Noted: 2023-07-18

## 2023-07-18 PROBLEM — E83.39 HYPERPHOSPHATEMIA: Status: ACTIVE | Noted: 2023-07-18

## 2023-07-18 PROBLEM — I10 ESSENTIAL HYPERTENSION: Status: ACTIVE | Noted: 2023-07-18

## 2023-07-18 PROBLEM — N18.4 CKD (CHRONIC KIDNEY DISEASE) STAGE 4, GFR 15-29 ML/MIN (HCC): Status: ACTIVE | Noted: 2023-07-18

## 2023-07-18 PROBLEM — E87.1 HYPONATREMIA: Status: ACTIVE | Noted: 2023-07-18

## 2023-07-19 LAB
ALBUMIN, U: >300 MG/DL
ALBUMIN: 3.7 G/DL
ALBUMIN: 3.9 G/DL
ALK PHOSPHATASE: 97 U/L
ALT SERPL-CCNC: 43 U/L
AST SERPL-CCNC: 33 U/L
BACTERIA, URINE: ABNORMAL /HPF
BASOPHILS # BLD: 0.03 X10^3UL
BILIRUB SERPL-MCNC: 0.6 MG/DL
BILIRUBIN, URINE: ABNORMAL
BUN BLDV-MCNC: 57 MG/DL
CALCIUM SERPL-MCNC: 9 MG/DL
CHARACTER, URINE: CLEAR
CHLORIDE BLD-SCNC: 107 MMOL/L
CO2: 26 MMOL/L
COLOR, URINE: ABNORMAL
CREAT SERPL-MCNC: 4.1 MG/DL
CREATININE URINE: 66.5 MG/DL
EOSINOPHIL # BLD: 0.24 X10^3UL
ERYTHROCYTE [DISTWIDTH] IN BLOOD BY AUTOMATED COUNT: 47.2 FL
GFR AFRICAN AMERICAN: 17.4 ML/M1.7
GFR NON-AFRICAN AMERICAN: 14.4 ML/M1.7
GLUCOSE, URINE: 500 MG/DL
GLUCOSE: 123 MG/DL
HCT VFR BLD CALC: 26.2 %
HEMOGLOBIN: 8.8 G/DL
IMMATURE GRANULOCYTES: 0.04 X10^3UL
KETONES, URINE: ABNORMAL MG/DL
LEUKOCYTE ESTERASE, URINE: ABNORMAL
LYMPHOCYTES # BLD: 1.13 X10^3UL
MAGNESIUM: 2.2 MG/DL
MCH RBC QN AUTO: 32.4 PG
MCHC RBC AUTO-ENTMCNC: 33.6 G/DL
MCV RBC AUTO: 96.3 FL
MICROALBUMIN/CREAT 24H UR: 311.4 MG/DL
MICROALBUMIN/CREAT UR-RTO: 4682.7 MG/G CR
MONOCYTES # BLD: 0.51 X10^3UL
MUCUS, URINE: ABNORMAL /HPF
NEUTROPHILS: 4.97 X10^3UL
NITRITE, URINE: ABNORMAL
OCCULT BLOOD,URINE: ABNORMAL
PH, URINE: 6.5
PHOSPHORUS: 4.7 MG/DL
PLATELETS: 176 X10^3UL
POTASSIUM SERPL-SCNC: 4.3 MMOL/L
PTH INTACT: 96.1 PG/ML
RBC URINE: ABNORMAL /HPF
RBC: 2.72 X10^6UL
SODIUM BLD-SCNC: 141 MMOL/L
SPECIFIC GRAVITY UA: 1.01
SQUAMOUS EPITHELIAL CELLS: ABNORMAL /HPF
TOTAL PROTEIN: 6.7 G/DL
URINE COLLECTION: ABNORMAL
UROBILINOGEN, URINE: 0.2 MG/DL
VITAMIN D 25-HYDROXY: 48.1 NG/ML
WBC URINE: ABNORMAL /HPF
WBC: 6.92 X10^3UL

## 2023-07-27 LAB
BASOPHILS # BLD: 0.02 X10^3UL
EOSINOPHIL # BLD: 0.2 X10^3UL
ERYTHROCYTE [DISTWIDTH] IN BLOOD BY AUTOMATED COUNT: 48 FL
HCT VFR BLD CALC: 25.7 %
HEMOGLOBIN: 8.7 G/DL
IMMATURE GRANULOCYTES: 0.03 X10^3UL
LYMPHOCYTES # BLD: 1.16 X10^3UL
MCH RBC QN AUTO: 32.3 PG
MCHC RBC AUTO-ENTMCNC: 33.9 G/DL
MCV RBC AUTO: 95.5 FL
MONOCYTES # BLD: 0.55 X10^3UL
NEUTROPHILS: 5.06 X10^3UL
PLATELETS: 203 X10^3UL
RBC: 2.69 X10^6UL
WBC: 7.02 X10^3UL

## 2023-07-28 ENCOUNTER — TELEPHONE (OUTPATIENT)
Age: 73
End: 2023-07-28

## 2023-07-28 ENCOUNTER — OFFICE VISIT (OUTPATIENT)
Age: 73
End: 2023-07-28

## 2023-07-28 VITALS
RESPIRATION RATE: 12 BRPM | SYSTOLIC BLOOD PRESSURE: 143 MMHG | HEART RATE: 64 BPM | BODY MASS INDEX: 23.83 KG/M2 | HEIGHT: 67 IN | DIASTOLIC BLOOD PRESSURE: 59 MMHG | WEIGHT: 151.8 LBS | TEMPERATURE: 97.4 F

## 2023-07-28 DIAGNOSIS — E11.22 CKD STAGE 5 DUE TO TYPE 2 DIABETES MELLITUS (HCC): ICD-10-CM

## 2023-07-28 DIAGNOSIS — N18.5 TYPE 2 DIABETES MELLITUS WITH STAGE 5 CHRONIC KIDNEY DISEASE NOT ON CHRONIC DIALYSIS, WITH LONG-TERM CURRENT USE OF INSULIN (HCC): ICD-10-CM

## 2023-07-28 DIAGNOSIS — E11.22 TYPE 2 DIABETES MELLITUS WITH STAGE 5 CHRONIC KIDNEY DISEASE NOT ON CHRONIC DIALYSIS, WITH LONG-TERM CURRENT USE OF INSULIN (HCC): ICD-10-CM

## 2023-07-28 DIAGNOSIS — N18.5 CKD STAGE 5 DUE TO TYPE 2 DIABETES MELLITUS (HCC): ICD-10-CM

## 2023-07-28 DIAGNOSIS — L05.91 PILONIDAL CYST: Primary | ICD-10-CM

## 2023-07-28 DIAGNOSIS — Z79.4 TYPE 2 DIABETES MELLITUS WITH STAGE 5 CHRONIC KIDNEY DISEASE NOT ON CHRONIC DIALYSIS, WITH LONG-TERM CURRENT USE OF INSULIN (HCC): ICD-10-CM

## 2023-07-28 PROBLEM — L97.529 TOE ULCER, LEFT, WITH UNSPECIFIED SEVERITY (HCC): Status: ACTIVE | Noted: 2023-07-28

## 2023-07-28 PROBLEM — E11.9 TYPE 2 DIABETES MELLITUS (HCC): Status: ACTIVE | Noted: 2023-07-28

## 2023-07-28 RX ORDER — METRONIDAZOLE 500 MG/1
500 TABLET ORAL 2 TIMES DAILY
Qty: 14 TABLET | Refills: 0 | Status: SHIPPED | OUTPATIENT
Start: 2023-07-28 | End: 2023-08-04

## 2023-07-28 RX ORDER — DOXYCYCLINE HYCLATE 100 MG
100 TABLET ORAL 2 TIMES DAILY
Qty: 14 TABLET | Refills: 0 | Status: SHIPPED | OUTPATIENT
Start: 2023-07-28 | End: 2023-08-04

## 2023-07-28 RX ORDER — LANCETS 33 GAUGE
1 EACH MISCELLANEOUS DAILY
COMMUNITY

## 2023-07-28 RX ORDER — BLOOD-GLUCOSE METER
1 EACH MISCELLANEOUS DAILY
COMMUNITY

## 2023-07-28 NOTE — PROGRESS NOTES
[address] [facility] Family Medicine Residency  [address] [facility], [facility] [facility]  Phone: [phone]  Fax: [phone]      Date of Visit:  2023  Patient Name: Shirley Quiñones Patient :  1950     HPI:     Shirley Quiñones is a 68 y.o. male who presents today to discuss   Chief Complaint   Patient presents with    Cyst     Drained a couple of days ago     Patient presents today with complaint of pilonidal cyst that appeared 3 to 4 days ago. Today, patient reports experiencing a lot of pain in the area, but cyst spontaneously began draining that evening. Drainage serosanguinous, noticed a little bit of drainage the following day, then it stopped. No pain after cyst began draining. Has had a pilonidal cyst before; pt unable to remember exact date, but states it was less than 5 years ago. Was referred to general surgery, Dr. Amaro Dense recommended no surgical treatment at the time. Patient denies fevers or chills. No p.o. meds for pain or topical treatments applied to the area. I personally reviewed the patient's past medical history, current medications, allergies, surgical history, family history and social history. Updates were made as necessary. REVIEW OF SYSTEM      As noted above in HPI.     REVIEWED INFORMATION      No Known Allergies    Patient Active Problem List   Diagnosis    CHF (congestive heart failure) ([facility])    CKD (chronic kidney disease) stage 4, GFR 15-29 ml/min ([facility])    Persistent proteinuria    Essential hypertension    Hyponatremia    Hyperphosphatemia    Secondary hyperparathyroidism of renal origin ([address])    Toe ulcer, left, with unspecified severity ([address])    Type 2 diabetes mellitus    Pilonidal cyst       Past Medical History:   Diagnosis Date    Below-knee amputation of left lower extremity ([address])     Below-knee amputation of right lower extremity ([facility])     CHF (congestive heart failure) ([facility])     CKD (chronic kidney disease) stage 4, GFR

## 2023-07-28 NOTE — TELEPHONE ENCOUNTER
Spoke with Dr. Kinza Perry after wife called to ask if pt should be on antibiotics, reported he had had osteomyelitis in the past. After further review of his chart and Up to Date, determined that patient does meet criteria for abx treatment. Called pt but got no response. Reviewed DWAINE scanned into chart and pt stated wife could be notified of dx and treatment options, so called [facility] and verified pt's . Advised that after review of chart, it was determined that he should be on abx because of his increased risk (systemic illness and immunocompromised status). Advised abx were sent to pharmacy, and again stressed that he should call with any status change. She had no questions and voiced understanding of the plan.

## 2023-07-28 NOTE — PATIENT INSTRUCTIONS
Thank you for following up with us at [facility] outpatient residency clinic! It was a pleasure to meet you today! Our plan is the following:  - the cyst is draining on its own and you are not experiencing any pain in the area, so no surgical intervention or antibiotics are necessary. If you do begin to experience pain again, or you notice swelling, warmth, or increased drainage from the area, please call the office to schedule another appointment for re-evaluation. If you decide you would like the surgery consult, please don't hesitate to call the office and let me know, and I'll generate one for you. - As discussed, the best thing you can do for yourself to prevent future recurrence is to make sure that you thoroughly clean and dry the area every day. If you have any additional questions or concerns, please call the office ([phone]) and speak to one of the staff. They will triage and forward the message to the doctors! Have a great rest of your day!

## 2023-07-28 NOTE — TELEPHONE ENCOUNTER
Lm for Paresh Bosch or Brannon to , Dr. Benson Santiago asked me to call and let pt know after looking at more information, Dr. Brant Abbott is going to send an antibiotic to the pharmacy for Paresh Bosch. He wanted to thank Brannon for calling to inquire about it.

## 2023-08-10 LAB
BASOPHILS # BLD: 0.03 X10^3UL
EOSINOPHIL # BLD: 0.16 X10^3UL
ERYTHROCYTE [DISTWIDTH] IN BLOOD BY AUTOMATED COUNT: 50.3 FL
HCT VFR BLD CALC: 28.1 %
HEMOGLOBIN: 9.4 G/DL
IMMATURE GRANULOCYTES: 0.01 X10^3UL
LYMPHOCYTES # BLD: 1.22 X10^3UL
MCH RBC QN AUTO: 32.2 PG
MCHC RBC AUTO-ENTMCNC: 33.5 G/DL
MCV RBC AUTO: 96.2 FL
MONOCYTES # BLD: 0.48 X10^3UL
NEUTROPHILS: 4.96 X10^3UL
PLATELETS: 175 X10^3UL
RBC: 2.92 X10^6UL
WBC: 6.86 X10^3UL

## 2023-08-10 NOTE — PROGRESS NOTES
[address] [facility] [facility]  [address] [facility], [address] [facility]  Phone: [phone]  Fax: [phone]    Subjective:      Martin Randolph is a 68 y.o. male with Hx of  has a past medical history of CHF, T2DM on insulin, CKD stage 5, HTN, HLD, secondary hyperparathyroidism, and bilateral BKA. Patient is here for a diabetes follow up. Meds -   Current Outpatient Medications   Medication Sig Dispense Refill    Lancets (ONETOUCH DELICA PLUS [id number]) [place] 1 each by Other route daily      Blood Glucose Monitoring Suppl (ONE TOUCH ULTRA 2) w/Device KIT 1 each by Other route daily      blood glucose test strips (ASCENSIA AUTODISC VI;ONE TOUCH ULTRA TEST VI) strip 1 strip by Other route daily      aspirin 81 MG EC tablet Adult Aspirin Regimen 81 mg tablet,delayed release   Take 1 tablet every day by oral route.       calcitRIOL (ROCALTROL) 0.25 MCG capsule Take 1 capsule by mouth daily      vitamin D 25 MCG (1000 UT) CAPS Take 1 tablet by mouth three times a week      ONETOUCH ULTRA strip       lisinopril (PRINIVIL;ZESTRIL) 5 MG tablet Take 1 tablet by mouth daily      potassium chloride (MICRO-K) 10 MEQ extended release capsule potassium chloride ER 10 mEq capsule,extended release   Take 1 capsule every day by oral route.      sodium bicarbonate 650 MG tablet Take 1 tablet by mouth      Dapagliflozin Propanediol (FARXIGA PO) Take 10 mg by mouth daily      atorvastatin (LIPITOR) 80 MG tablet TAKE 1 TABLET BY MOUTH EVERY DAY      carvedilol (COREG) 6.25 MG tablet TAKE 1 TABLET BY MOUTH TWICE A DAY      Ferrous Sulfate (IRON) 325 (65 Fe) MG TABS Take 1 tablet by mouth daily      furosemide (LASIX) 40 MG tablet Take 1 tablet by mouth See Admin Instructions 40/80 mg alternating days      LEVEMIR FLEXTOUCH 100 UNIT/ML injection pen 8 Units nightly      isosorbide mononitrate (IMDUR) 30 MG extended release tablet TAKE 1 TABLET BY MOUTH EVERY DAY IN THE MORNING

## 2023-08-11 ENCOUNTER — OFFICE VISIT (OUTPATIENT)
Age: 73
End: 2023-08-11
Payer: MEDICARE

## 2023-08-11 VITALS
SYSTOLIC BLOOD PRESSURE: 131 MMHG | WEIGHT: 153.6 LBS | TEMPERATURE: 97.5 F | DIASTOLIC BLOOD PRESSURE: 54 MMHG | RESPIRATION RATE: 16 BRPM | BODY MASS INDEX: 24.06 KG/M2 | HEART RATE: 62 BPM

## 2023-08-11 DIAGNOSIS — Z78.9 NONSMOKER: ICD-10-CM

## 2023-08-11 DIAGNOSIS — N18.5 HYPERTENSIVE HEART DISEASE WITH HEART FAILURE AND STAGE 5 CHRONIC KIDNEY DISEASE, NOT ON CHRONIC DIALYSIS, UNSPECIFIED HEART FAILURE TYPE (HCC): ICD-10-CM

## 2023-08-11 DIAGNOSIS — N18.5 TYPE 2 DIABETES MELLITUS WITH STAGE 5 CHRONIC KIDNEY DISEASE NOT ON CHRONIC DIALYSIS, WITH LONG-TERM CURRENT USE OF INSULIN (HCC): Primary | ICD-10-CM

## 2023-08-11 DIAGNOSIS — I13.2 HYPERTENSIVE HEART DISEASE WITH HEART FAILURE AND STAGE 5 CHRONIC KIDNEY DISEASE, NOT ON CHRONIC DIALYSIS, UNSPECIFIED HEART FAILURE TYPE (HCC): ICD-10-CM

## 2023-08-11 DIAGNOSIS — I50.22 CHRONIC SYSTOLIC CONGESTIVE HEART FAILURE (HCC): ICD-10-CM

## 2023-08-11 DIAGNOSIS — N18.5 CKD (CHRONIC KIDNEY DISEASE) STAGE 5, GFR LESS THAN 15 ML/MIN (HCC): ICD-10-CM

## 2023-08-11 DIAGNOSIS — E11.22 TYPE 2 DIABETES MELLITUS WITH STAGE 5 CHRONIC KIDNEY DISEASE NOT ON CHRONIC DIALYSIS, WITH LONG-TERM CURRENT USE OF INSULIN (HCC): Primary | ICD-10-CM

## 2023-08-11 DIAGNOSIS — Z79.4 TYPE 2 DIABETES MELLITUS WITH STAGE 5 CHRONIC KIDNEY DISEASE NOT ON CHRONIC DIALYSIS, WITH LONG-TERM CURRENT USE OF INSULIN (HCC): Primary | ICD-10-CM

## 2023-08-11 DIAGNOSIS — E78.2 MIXED HYPERLIPIDEMIA: ICD-10-CM

## 2023-08-11 PROBLEM — L97.529 TOE ULCER, LEFT, WITH UNSPECIFIED SEVERITY (HCC): Status: RESOLVED | Noted: 2023-07-28 | Resolved: 2023-08-11

## 2023-08-11 PROBLEM — L05.91 PILONIDAL CYST: Status: RESOLVED | Noted: 2023-07-28 | Resolved: 2023-08-11

## 2023-08-11 PROCEDURE — 3046F HEMOGLOBIN A1C LEVEL >9.0%: CPT

## 2023-08-11 PROCEDURE — 99213 OFFICE O/P EST LOW 20 MIN: CPT

## 2023-08-11 PROCEDURE — 1123F ACP DISCUSS/DSCN MKR DOCD: CPT

## 2023-08-11 SDOH — ECONOMIC STABILITY: TRANSPORTATION INSECURITY
IN THE PAST 12 MONTHS, HAS THE LACK OF TRANSPORTATION KEPT YOU FROM MEDICAL APPOINTMENTS OR FROM GETTING MEDICATIONS?: NO

## 2023-08-11 SDOH — ECONOMIC STABILITY: TRANSPORTATION INSECURITY
IN THE PAST 12 MONTHS, HAS LACK OF TRANSPORTATION KEPT YOU FROM MEETINGS, WORK, OR FROM GETTING THINGS NEEDED FOR DAILY LIVING?: NO

## 2023-08-11 SDOH — ECONOMIC STABILITY: INCOME INSECURITY: IN THE LAST 12 MONTHS, WAS THERE A TIME WHEN YOU WERE NOT ABLE TO PAY THE MORTGAGE OR RENT ON TIME?: NO

## 2023-08-11 SDOH — ECONOMIC STABILITY: HOUSING INSECURITY: IN THE LAST 12 MONTHS, HOW MANY PLACES HAVE YOU LIVED?: 1

## 2023-08-11 ASSESSMENT — PATIENT HEALTH QUESTIONNAIRE - PHQ9
1. LITTLE INTEREST OR PLEASURE IN DOING THINGS: NOT AT ALL
2. FEELING DOWN, DEPRESSED OR HOPELESS: NOT AT ALL
SUM OF ALL RESPONSES TO PHQ9 QUESTIONS 1 & 2: 0

## 2023-08-11 ASSESSMENT — ENCOUNTER SYMPTOMS
DIARRHEA: 0
ABDOMINAL PAIN: 0
BACK PAIN: 0
COUGH: 0
SHORTNESS OF BREATH: 0
VOMITING: 0
CONSTIPATION: 0
NAUSEA: 0
EYE PAIN: 0

## 2023-08-11 ASSESSMENT — LIFESTYLE VARIABLES
HOW MANY STANDARD DRINKS CONTAINING ALCOHOL DO YOU HAVE ON A TYPICAL DAY: PATIENT DOES NOT DRINK
HOW OFTEN DO YOU HAVE A DRINK CONTAINING ALCOHOL: NEVER

## 2023-08-11 NOTE — PATIENT INSTRUCTIONS
Thank you for following up with us at [facility] outpatient residency clinic! It was a pleasure to meet you today! Our plan is the following:  - You're doing a fantastic job keeping up with your health. Keep up the good work!  - Please let me know if you need any refills or have any concerns in between visits. If you have any additional questions or concerns, please call the office ([phone]) and speak to one of the staff. They will triage and forward the message to the doctors! Have a great rest of your day!

## 2023-08-14 PROBLEM — I50.22 CHRONIC SYSTOLIC CONGESTIVE HEART FAILURE (HCC): Status: ACTIVE | Noted: 2023-07-18

## 2023-08-17 LAB
ALBUMIN, U: 100 MG/DL
ALBUMIN: 4 G/DL
ALK PHOSPHATASE: 135 U/L
ALT SERPL-CCNC: 39 U/L
AST SERPL-CCNC: 37 U/L
BACTERIA, URINE: ABNORMAL /HPF
BASOPHILS # BLD: 0.04 X10^3UL
BILIRUB SERPL-MCNC: 0.5 MG/DL
BILIRUBIN, URINE: ABNORMAL
BUN BLDV-MCNC: 61 MG/DL
CALCIUM SERPL-MCNC: 8.8 MG/DL
CHARACTER, URINE: CLEAR
CHLORIDE BLD-SCNC: 103 MMOL/L
CO2: 26 MMOL/L
COLOR, URINE: ABNORMAL
CREAT SERPL-MCNC: 4.2 MG/DL
CREATININE URINE: 43.1 MG/DL
EOSINOPHIL # BLD: 0.27 X10^3UL
ERYTHROCYTE [DISTWIDTH] IN BLOOD BY AUTOMATED COUNT: 51.9 FL
GFR AFRICAN AMERICAN: 17 ML/M1.7
GFR NON-AFRICAN AMERICAN: 14 ML/M1.7
GLUCOSE, URINE: 500 MG/DL
GLUCOSE: 176 MG/DL
HCT VFR BLD CALC: 28.9 %
HEMOGLOBIN: 9.6 G/DL
IMMATURE GRANULOCYTES: 0.03 X10^3UL
KETONES, URINE: ABNORMAL MG/DL
LEUKOCYTE ESTERASE, URINE: ABNORMAL
LYMPHOCYTES # BLD: 1.41 X10^3UL
MAGNESIUM: 2.1 MG/DL
MCH RBC QN AUTO: 32.4 PG
MCHC RBC AUTO-ENTMCNC: 33.2 G/DL
MCV RBC AUTO: 97.6 FL
MICROALBUMIN/CREAT 24H UR: 81.75 MG/DL
MICROALBUMIN/CREAT UR-RTO: 1896.8 MG/G CR
MONOCYTES # BLD: 0.6 X10^3UL
MUCUS, URINE: ABNORMAL /HPF
NEUTROPHILS: 5.25 X10^3UL
NITRITE, URINE: ABNORMAL
OCCULT BLOOD,URINE: ABNORMAL
PH, URINE: 6
PHOSPHORUS: 5.9 MG/DL
PLATELETS: 212 X10^3UL
POTASSIUM SERPL-SCNC: 4.3 MMOL/L
PTH INTACT: 99 PG/ML
RBC URINE: ABNORMAL /HPF
RBC: 2.96 X10^6UL
SODIUM BLD-SCNC: 140 MMOL/L
SPECIFIC GRAVITY UA: 1.01
SQUAMOUS EPITHELIAL CELLS: ABNORMAL /HPF
TOTAL PROTEIN: 6.9 G/DL
URINE COLLECTION: ABNORMAL
UROBILINOGEN, URINE: 0.2 MG/DL
VITAMIN D 25-HYDROXY: 45.4 NG/ML
WBC URINE: ABNORMAL /HPF
WBC: 7.6 X10^3UL

## 2023-08-24 LAB
BASOPHILS # BLD: 0.02 X10^3UL
EOSINOPHIL # BLD: 0.26 X10^3UL
ERYTHROCYTE [DISTWIDTH] IN BLOOD BY AUTOMATED COUNT: 51.2 FL
HCT VFR BLD CALC: 24.7 %
HEMOGLOBIN: 8.4 G/DL
IGA: 362 MG/DL
IGG: 785 MG/DL
IGM: 58 MG/DL
IMMATURE GRANULOCYTES: 0.02 X10^3UL
KAPPA, FREE LIGHT CHAINS, SERUM: 8.68 MG/DL
KAPPA/LAMBDA, FREE RATIO, S: 1.24
LAMBDA, FREE LIGHT CHAINS, SERUM: 6.99 MG/DL
LYMPHOCYTES # BLD: 1.29 X10^3UL
MCH RBC QN AUTO: 33.1 PG
MCHC RBC AUTO-ENTMCNC: 34 G/DL
MCV RBC AUTO: 97.2 FL
MONOCYTES # BLD: 0.47 X10^3UL
NEUTROPHILS: 4.26 X10^3UL
PLATELETS: 167 X10^3UL
RBC: 2.54 X10^6UL
WBC: 6.32 X10^3UL

## 2023-08-25 ENCOUNTER — TELEPHONE (OUTPATIENT)
Age: 73
End: 2023-08-25

## 2023-08-25 NOTE — TELEPHONE ENCOUNTER
Patent wife, Darryl Herrera, on HIPAA, notified that Levemir Flexpen 5x3ml prefilled pens received 1 box of 5 pens. In med room in refrigerator.

## 2023-08-28 DIAGNOSIS — N18.5 TYPE 2 DIABETES MELLITUS WITH STAGE 5 CHRONIC KIDNEY DISEASE NOT ON CHRONIC DIALYSIS, WITH LONG-TERM CURRENT USE OF INSULIN (HCC): Primary | ICD-10-CM

## 2023-08-28 DIAGNOSIS — Z79.4 TYPE 2 DIABETES MELLITUS WITH STAGE 5 CHRONIC KIDNEY DISEASE NOT ON CHRONIC DIALYSIS, WITH LONG-TERM CURRENT USE OF INSULIN (HCC): Primary | ICD-10-CM

## 2023-08-28 DIAGNOSIS — E11.22 TYPE 2 DIABETES MELLITUS WITH STAGE 5 CHRONIC KIDNEY DISEASE NOT ON CHRONIC DIALYSIS, WITH LONG-TERM CURRENT USE OF INSULIN (HCC): Primary | ICD-10-CM

## 2023-08-28 RX ORDER — BLOOD SUGAR DIAGNOSTIC
STRIP MISCELLANEOUS
Qty: 100 EACH | Refills: 3 | Status: SHIPPED | OUTPATIENT
Start: 2023-08-28

## 2023-08-29 DIAGNOSIS — I50.22 CHRONIC SYSTOLIC CONGESTIVE HEART FAILURE (HCC): Primary | ICD-10-CM

## 2023-08-29 RX ORDER — TICAGRELOR 90 MG/1
90 TABLET ORAL 2 TIMES DAILY
Qty: 180 TABLET | Refills: 0 | Status: SHIPPED | OUTPATIENT
Start: 2023-08-29

## 2023-08-29 NOTE — TELEPHONE ENCOUNTER
Hi Dr. Venkatesh Ross! Kg Hanna was in today for a visit and mentioned to [address] [facility] needed to re-enroll in the patient assistance for his Brilinta. Rell Martines is made by the same company as Lauralyn Bruce, so since he was already enrolled in the patient assistance for Lauralyn Bruce, I called the company and they said that they will continue his enrollment for Brilinta if we send them a new script. They said we can fax/e-prescribe it (special [facility]). I put it in here for you. Please send. Thanks!

## 2023-09-07 LAB
ALBUMIN: 4 G/DL
ALK PHOSPHATASE: 140 U/L
ALT SERPL-CCNC: 31 U/L
AST SERPL-CCNC: 34 U/L
BASOPHILS # BLD: 0.03 X10^3UL
BILIRUB SERPL-MCNC: 0.4 MG/DL
BUN BLDV-MCNC: 59 MG/DL
CALCIUM SERPL-MCNC: 9.2 MG/DL
CHLORIDE BLD-SCNC: 104 MMOL/L
CO2: 25 MMOL/L
CREAT SERPL-MCNC: 5 MG/DL
EOSINOPHIL # BLD: 0.27 X10^3UL
ERYTHROCYTE [DISTWIDTH] IN BLOOD BY AUTOMATED COUNT: 49 FL
FERRITIN: 272 NG/ML
GFR AFRICAN AMERICAN: 13.9 ML/M1.7
GFR NON-AFRICAN AMERICAN: 11.5 ML/M1.7
GLUCOSE: 136 MG/DL
HCT VFR BLD CALC: 28 %
HEMOGLOBIN: 9.3 G/DL
IMMATURE GRANULOCYTES: 0.02 X10^3UL
IRON % SATURATION: 23 %
IRON: 63 UG/DL
LYMPHOCYTES # BLD: 1.51 X10^3UL
MCH RBC QN AUTO: 31.8 PG
MCHC RBC AUTO-ENTMCNC: 33.2 G/DL
MCV RBC AUTO: 95.9 FL
MONOCYTES # BLD: 0.55 X10^3UL
NEUTROPHILS: 4.5 X10^3UL
PLATELETS: 185 X10^3UL
POTASSIUM SERPL-SCNC: 3.8 MMOL/L
RBC: 2.92 X10^6UL
SODIUM BLD-SCNC: 141 MMOL/L
TOTAL IRON BINDING CAPACITY: 280 UG/DL
TOTAL PROTEIN: 7 G/DL
WBC: 6.88 X10^3UL

## 2023-09-12 DIAGNOSIS — I50.22 CHRONIC SYSTOLIC CONGESTIVE HEART FAILURE (HCC): Primary | ICD-10-CM

## 2023-09-12 DIAGNOSIS — N18.5 HYPERTENSIVE HEART AND KIDNEY DISEASE WITH CHRONIC SYSTOLIC CONGESTIVE HEART FAILURE AND STAGE 5 CHRONIC KIDNEY DISEASE NOT ON CHRONIC DIALYSIS (HCC): ICD-10-CM

## 2023-09-12 DIAGNOSIS — I50.22 HYPERTENSIVE HEART AND KIDNEY DISEASE WITH CHRONIC SYSTOLIC CONGESTIVE HEART FAILURE AND STAGE 5 CHRONIC KIDNEY DISEASE NOT ON CHRONIC DIALYSIS (HCC): ICD-10-CM

## 2023-09-12 DIAGNOSIS — I13.2 HYPERTENSIVE HEART AND KIDNEY DISEASE WITH CHRONIC SYSTOLIC CONGESTIVE HEART FAILURE AND STAGE 5 CHRONIC KIDNEY DISEASE NOT ON CHRONIC DIALYSIS (HCC): ICD-10-CM

## 2023-09-12 RX ORDER — FUROSEMIDE 40 MG/1
40 TABLET ORAL SEE ADMIN INSTRUCTIONS
Qty: 135 TABLET | Refills: 0 | Status: SHIPPED | OUTPATIENT
Start: 2023-09-12 | End: 2023-12-11

## 2023-09-19 ENCOUNTER — TELEPHONE (OUTPATIENT)
Age: 73
End: 2023-09-19

## 2023-09-19 NOTE — TELEPHONE ENCOUNTER
Patient wife called and stated that the patient will be out of his brilinta in 30 days and Genny Cabrera has not sent him his new shipment

## 2023-09-19 NOTE — TELEPHONE ENCOUNTER
Student Zunilda called patient assistance and they said they would process it to be arrived at the patient's home in 10-14 business days. I called the [facility] pharmacy and they noted that they have the prescription but are waiting for authorization from [place], which should be coming shortly as we pushed the refill through from the patient assistance company. Patient has been notified. No further action needed.

## 2023-11-06 ENCOUNTER — TELEPHONE (OUTPATIENT)
Age: 73
End: 2023-11-06

## 2023-11-06 NOTE — TELEPHONE ENCOUNTER
Please see inbox for [employer] P[employer] application paperwork that needs to be filled out. Fax when completed.

## 2023-11-30 DIAGNOSIS — I50.22 CHRONIC SYSTOLIC CONGESTIVE HEART FAILURE (HCC): ICD-10-CM

## 2023-11-30 RX ORDER — TICAGRELOR 90 MG/1
90 TABLET ORAL 2 TIMES DAILY
Qty: 180 TABLET | Refills: 4 | Status: SHIPPED | OUTPATIENT
Start: 2023-11-30

## 2023-11-30 NOTE — TELEPHONE ENCOUNTER
Hi Dr. Zuleyma Nazario! Arnold's wife Brannon called in and said that she only got one fill of the Brilinta from patient assistance and needs a refill. He is approved for next year for one year for the Brilinta. Can you send the pended medication to the pharmacy? I \"reordered' it and then changed the refills to be for one year. I also already selected the correct pharmacy. Thanks!

## 2023-12-06 ENCOUNTER — TELEPHONE (OUTPATIENT)
Age: 73
End: 2023-12-06

## 2023-12-07 ENCOUNTER — TELEPHONE (OUTPATIENT)
Age: 73
End: 2023-12-07

## 2023-12-07 ENCOUNTER — PATIENT MESSAGE (OUTPATIENT)
Age: 73
End: 2023-12-07

## 2023-12-07 DIAGNOSIS — I50.22 CHRONIC SYSTOLIC CONGESTIVE HEART FAILURE (HCC): Primary | ICD-10-CM

## 2023-12-07 NOTE — TELEPHONE ENCOUNTER
Sukhdev Lazo Guess! Can you send in a prescription to [facility] pharmacy (which is the patient assistance pharmacy) for [employer]. It will need to be 90 days with three refills. Thanks!

## 2023-12-07 NOTE — TELEPHONE ENCOUNTER
Called patient to notify him (and called his wife as well) that [employer] is not going to be producing Levemir anymore. He will need to fill out patient assistance for Basaglar. Sent the application through the portal.  Closing encounter for now.

## 2023-12-08 NOTE — TELEPHONE ENCOUNTER
Called patient (spoke with wife Ben Yoo) and clarified patient assistance. She will fill out the forms and send back to me. Closing encounter.

## 2023-12-08 NOTE — TELEPHONE ENCOUNTER
Lamin [place], [place] 12/8/2023 8:15 AM EST      ----- Message -----  From: Dawit Pulido Sent: 12/7/2023 7:05 PM EST  To: Makayla Salas Staff  Subject: Patient assistance     [facility]: I am reading the qualifications for [employer]. Page 1 under Who qualifies\". The second to last bullet point: Either: He has 2 and 3 but in parentheses is listed what he can't have. Medicare Advantage is listed which he has. Medicare Advantage is not a secondary insurance so i am confused.  Call me [phone]

## 2024-02-27 ENCOUNTER — TELEPHONE (OUTPATIENT)
Age: 74
End: 2024-02-27

## 2024-03-04 ENCOUNTER — TELEPHONE (OUTPATIENT)
Age: 74
End: 2024-03-04

## 2024-03-04 NOTE — TELEPHONE ENCOUNTER
Received message that patient needs pen needles.  The company ([employer]) that supplies his patient assistance insulin does not supply needles.  Left a message on Arnold's voicemail that he could either get a script sent to the pharmacy to  through insurance (should cover for a nominal copay) or could fill out patient assistance for [employer] which supplies needles.  Await call back.

## 2024-03-12 NOTE — TELEPHONE ENCOUNTER
Caitlin talked to patient this morning and he states he has two boxes of pen needles at home. See Caitlin's documentation in chart.    Please let me know if patient told you something different. Otherwise, he should be good to go for now.

## 2024-03-12 NOTE — TELEPHONE ENCOUNTER
Spoke with patient and he said that he has two boxes of pen needles left (which would be 200), so he is good for now.  Will look at options down the road.

## 2024-03-21 DIAGNOSIS — I50.22 HYPERTENSIVE HEART AND KIDNEY DISEASE WITH CHRONIC SYSTOLIC CONGESTIVE HEART FAILURE AND STAGE 5 CHRONIC KIDNEY DISEASE NOT ON CHRONIC DIALYSIS (HCC): ICD-10-CM

## 2024-03-21 DIAGNOSIS — I50.22 CHRONIC SYSTOLIC CONGESTIVE HEART FAILURE (HCC): ICD-10-CM

## 2024-03-21 DIAGNOSIS — I13.2 HYPERTENSIVE HEART AND KIDNEY DISEASE WITH CHRONIC SYSTOLIC CONGESTIVE HEART FAILURE AND STAGE 5 CHRONIC KIDNEY DISEASE NOT ON CHRONIC DIALYSIS (HCC): ICD-10-CM

## 2024-03-21 DIAGNOSIS — N18.5 HYPERTENSIVE HEART AND KIDNEY DISEASE WITH CHRONIC SYSTOLIC CONGESTIVE HEART FAILURE AND STAGE 5 CHRONIC KIDNEY DISEASE NOT ON CHRONIC DIALYSIS (HCC): ICD-10-CM

## 2024-03-21 RX ORDER — FUROSEMIDE 40 MG/1
40 TABLET ORAL SEE ADMIN INSTRUCTIONS
Qty: 135 TABLET | Refills: 0 | Status: SHIPPED | OUTPATIENT
Start: 2024-03-21

## 2024-04-17 DIAGNOSIS — N18.5 TYPE 2 DIABETES MELLITUS WITH STAGE 5 CHRONIC KIDNEY DISEASE NOT ON CHRONIC DIALYSIS, WITH LONG-TERM CURRENT USE OF INSULIN (HCC): Primary | ICD-10-CM

## 2024-04-17 DIAGNOSIS — Z79.4 TYPE 2 DIABETES MELLITUS WITH STAGE 5 CHRONIC KIDNEY DISEASE NOT ON CHRONIC DIALYSIS, WITH LONG-TERM CURRENT USE OF INSULIN (HCC): Primary | ICD-10-CM

## 2024-04-17 DIAGNOSIS — E11.22 TYPE 2 DIABETES MELLITUS WITH STAGE 5 CHRONIC KIDNEY DISEASE NOT ON CHRONIC DIALYSIS, WITH LONG-TERM CURRENT USE OF INSULIN (HCC): Primary | ICD-10-CM

## 2024-04-18 RX ORDER — LANCETS 33 GAUGE
1 EACH MISCELLANEOUS DAILY
Qty: 100 EACH | Refills: 2 | Status: SHIPPED | OUTPATIENT
Start: 2024-04-18

## 2024-06-09 DIAGNOSIS — I50.22 CHRONIC SYSTOLIC CONGESTIVE HEART FAILURE (HCC): ICD-10-CM

## 2024-06-09 DIAGNOSIS — I50.22 HYPERTENSIVE HEART AND KIDNEY DISEASE WITH CHRONIC SYSTOLIC CONGESTIVE HEART FAILURE AND STAGE 5 CHRONIC KIDNEY DISEASE NOT ON CHRONIC DIALYSIS (HCC): ICD-10-CM

## 2024-06-09 DIAGNOSIS — I13.2 HYPERTENSIVE HEART AND KIDNEY DISEASE WITH CHRONIC SYSTOLIC CONGESTIVE HEART FAILURE AND STAGE 5 CHRONIC KIDNEY DISEASE NOT ON CHRONIC DIALYSIS (HCC): ICD-10-CM

## 2024-06-09 DIAGNOSIS — N18.5 HYPERTENSIVE HEART AND KIDNEY DISEASE WITH CHRONIC SYSTOLIC CONGESTIVE HEART FAILURE AND STAGE 5 CHRONIC KIDNEY DISEASE NOT ON CHRONIC DIALYSIS (HCC): ICD-10-CM

## 2024-06-10 RX ORDER — FUROSEMIDE 40 MG/1
40 TABLET ORAL SEE ADMIN INSTRUCTIONS
Qty: 135 TABLET | Refills: 0 | Status: SHIPPED | OUTPATIENT
Start: 2024-06-10

## 2024-07-19 DIAGNOSIS — N18.5 TYPE 2 DIABETES MELLITUS WITH STAGE 5 CHRONIC KIDNEY DISEASE NOT ON CHRONIC DIALYSIS, WITH LONG-TERM CURRENT USE OF INSULIN (HCC): Primary | ICD-10-CM

## 2024-07-19 DIAGNOSIS — E11.22 TYPE 2 DIABETES MELLITUS WITH STAGE 5 CHRONIC KIDNEY DISEASE NOT ON CHRONIC DIALYSIS, WITH LONG-TERM CURRENT USE OF INSULIN (HCC): Primary | ICD-10-CM

## 2024-07-19 DIAGNOSIS — Z79.4 TYPE 2 DIABETES MELLITUS WITH STAGE 5 CHRONIC KIDNEY DISEASE NOT ON CHRONIC DIALYSIS, WITH LONG-TERM CURRENT USE OF INSULIN (HCC): Primary | ICD-10-CM

## 2024-08-16 ENCOUNTER — OFFICE VISIT (OUTPATIENT)
Age: 74
End: 2024-08-16
Payer: MEDICARE

## 2024-08-16 ENCOUNTER — PATIENT MESSAGE (OUTPATIENT)
Age: 74
End: 2024-08-16

## 2024-08-16 ENCOUNTER — TELEPHONE (OUTPATIENT)
Age: 74
End: 2024-08-16

## 2024-08-16 VITALS
BODY MASS INDEX: 23.9 KG/M2 | SYSTOLIC BLOOD PRESSURE: 152 MMHG | HEART RATE: 65 BPM | RESPIRATION RATE: 18 BRPM | WEIGHT: 157.7 LBS | DIASTOLIC BLOOD PRESSURE: 60 MMHG | HEIGHT: 68 IN | TEMPERATURE: 97.6 F

## 2024-08-16 DIAGNOSIS — Z89.511 S/P BKA (BELOW KNEE AMPUTATION) BILATERAL (HCC): ICD-10-CM

## 2024-08-16 DIAGNOSIS — I13.2 HYPERTENSIVE HEART AND KIDNEY DISEASE WITH CHRONIC SYSTOLIC CONGESTIVE HEART FAILURE AND STAGE 5 CHRONIC KIDNEY DISEASE NOT ON CHRONIC DIALYSIS (HCC): ICD-10-CM

## 2024-08-16 DIAGNOSIS — N18.5 ANEMIA IN STAGE 5 CHRONIC KIDNEY DISEASE, NOT ON CHRONIC DIALYSIS (HCC): ICD-10-CM

## 2024-08-16 DIAGNOSIS — D63.1 ANEMIA IN STAGE 5 CHRONIC KIDNEY DISEASE, NOT ON CHRONIC DIALYSIS (HCC): ICD-10-CM

## 2024-08-16 DIAGNOSIS — Z23 NEED FOR VACCINATION: ICD-10-CM

## 2024-08-16 DIAGNOSIS — N18.5 HYPERTENSIVE HEART AND KIDNEY DISEASE WITH CHRONIC SYSTOLIC CONGESTIVE HEART FAILURE AND STAGE 5 CHRONIC KIDNEY DISEASE NOT ON CHRONIC DIALYSIS (HCC): ICD-10-CM

## 2024-08-16 DIAGNOSIS — I50.22 HYPERTENSIVE HEART AND KIDNEY DISEASE WITH CHRONIC SYSTOLIC CONGESTIVE HEART FAILURE AND STAGE 5 CHRONIC KIDNEY DISEASE NOT ON CHRONIC DIALYSIS (HCC): ICD-10-CM

## 2024-08-16 DIAGNOSIS — Z78.9 NONSMOKER: ICD-10-CM

## 2024-08-16 DIAGNOSIS — E11.22 TYPE 2 DIABETES MELLITUS WITH STAGE 5 CHRONIC KIDNEY DISEASE NOT ON CHRONIC DIALYSIS, WITH LONG-TERM CURRENT USE OF INSULIN (HCC): ICD-10-CM

## 2024-08-16 DIAGNOSIS — Z79.4 CONTROLLED TYPE 2 DIABETES MELLITUS WITH HYPOGLYCEMIA, WITH LONG-TERM CURRENT USE OF INSULIN (HCC): Primary | ICD-10-CM

## 2024-08-16 DIAGNOSIS — N18.5 CKD (CHRONIC KIDNEY DISEASE) STAGE 5, GFR LESS THAN 15 ML/MIN (HCC): ICD-10-CM

## 2024-08-16 DIAGNOSIS — Z79.4 TYPE 2 DIABETES MELLITUS WITH STAGE 5 CHRONIC KIDNEY DISEASE NOT ON CHRONIC DIALYSIS, WITH LONG-TERM CURRENT USE OF INSULIN (HCC): ICD-10-CM

## 2024-08-16 DIAGNOSIS — N18.5 TYPE 2 DIABETES MELLITUS WITH STAGE 5 CHRONIC KIDNEY DISEASE NOT ON CHRONIC DIALYSIS, WITH LONG-TERM CURRENT USE OF INSULIN (HCC): ICD-10-CM

## 2024-08-16 DIAGNOSIS — E11.649 CONTROLLED TYPE 2 DIABETES MELLITUS WITH HYPOGLYCEMIA, WITH LONG-TERM CURRENT USE OF INSULIN (HCC): Primary | ICD-10-CM

## 2024-08-16 DIAGNOSIS — Z89.512 S/P BKA (BELOW KNEE AMPUTATION) BILATERAL (HCC): ICD-10-CM

## 2024-08-16 DIAGNOSIS — E78.2 MIXED HYPERLIPIDEMIA: ICD-10-CM

## 2024-08-16 DIAGNOSIS — I50.22 CHRONIC SYSTOLIC CONGESTIVE HEART FAILURE (HCC): ICD-10-CM

## 2024-08-16 PROBLEM — D63.8 ANEMIA OF CHRONIC DISEASE: Status: ACTIVE | Noted: 2024-08-16

## 2024-08-16 PROBLEM — N18.4 CKD (CHRONIC KIDNEY DISEASE) STAGE 4, GFR 15-29 ML/MIN (HCC): Status: RESOLVED | Noted: 2023-07-18 | Resolved: 2024-08-16

## 2024-08-16 LAB — HBA1C MFR BLD: 6 %

## 2024-08-16 PROCEDURE — 3044F HG A1C LEVEL LT 7.0%: CPT

## 2024-08-16 PROCEDURE — 83036 HEMOGLOBIN GLYCOSYLATED A1C: CPT

## 2024-08-16 PROCEDURE — 99214 OFFICE O/P EST MOD 30 MIN: CPT

## 2024-08-16 PROCEDURE — 1123F ACP DISCUSS/DSCN MKR DOCD: CPT

## 2024-08-16 PROCEDURE — 90677 PCV20 VACCINE IM: CPT | Performed by: FAMILY MEDICINE

## 2024-08-16 RX ORDER — INSULIN GLARGINE 100 [IU]/ML
5 INJECTION, SOLUTION SUBCUTANEOUS NIGHTLY
COMMUNITY

## 2024-08-16 RX ORDER — FUROSEMIDE 40 MG
40 TABLET ORAL SEE ADMIN INSTRUCTIONS
Qty: 135 TABLET | Refills: 0 | Status: SHIPPED | OUTPATIENT
Start: 2024-08-16 | End: 2024-08-30 | Stop reason: SDUPTHER

## 2024-08-16 RX ORDER — CALCIUM CARBONATE 750 MG/1
2 TABLET, CHEWABLE ORAL 3 TIMES DAILY
COMMUNITY

## 2024-08-16 SDOH — ECONOMIC STABILITY: FOOD INSECURITY: WITHIN THE PAST 12 MONTHS, YOU WORRIED THAT YOUR FOOD WOULD RUN OUT BEFORE YOU GOT MONEY TO BUY MORE.: NEVER TRUE

## 2024-08-16 SDOH — ECONOMIC STABILITY: FOOD INSECURITY: WITHIN THE PAST 12 MONTHS, THE FOOD YOU BOUGHT JUST DIDN'T LAST AND YOU DIDN'T HAVE MONEY TO GET MORE.: NEVER TRUE

## 2024-08-16 SDOH — ECONOMIC STABILITY: INCOME INSECURITY: HOW HARD IS IT FOR YOU TO PAY FOR THE VERY BASICS LIKE FOOD, HOUSING, MEDICAL CARE, AND HEATING?: NOT HARD AT ALL

## 2024-08-16 ASSESSMENT — PATIENT HEALTH QUESTIONNAIRE - PHQ9
1. LITTLE INTEREST OR PLEASURE IN DOING THINGS: NOT AT ALL
SUM OF ALL RESPONSES TO PHQ QUESTIONS 1-9: 0
2. FEELING DOWN, DEPRESSED OR HOPELESS: NOT AT ALL
SUM OF ALL RESPONSES TO PHQ QUESTIONS 1-9: 0
SUM OF ALL RESPONSES TO PHQ9 QUESTIONS 1 & 2: 0

## 2024-08-16 NOTE — PATIENT INSTRUCTIONS
Thank you for following up with us at [facility] outpatient residency clinic! It was a pleasure to meet you today!     Our plan is the following:  - Please decrease your nighttime long-acting insulin (insulin detemir) from 8 units to 5 units. Keep an eye on your sugars after this adjustment. Dr. Norris will check in on your during your annual wellness visit to see how your sugars are. Please notify me if you go below 60 again or are feeling unwell.  -Please  your Lasix from the pharmacy.     If you have any additional questions or concerns, please call the office ([phone]) and speak to one of the staff. They will triage and forward the message to the doctors! Have a great rest of your day!

## 2024-08-16 NOTE — TELEPHONE ENCOUNTER
He's on Basalgar.  I'll get his readings and report back to you since I don't have consent to manage his DM at this time.  Thanks!

## 2024-08-16 NOTE — TELEPHONE ENCOUNTER
Please check in on patient's blood glucose when you see him for his MAW. Home blood glucose averaging 100-120.  Reported one episode of symptomatic hypoglycemia (blood sugar 60).    I did decrease his detemir from 8 units nightly to 5 units as a result.    PCV20 given on 8/16/2024

## 2024-08-16 NOTE — PROGRESS NOTES
CALCIUM 8.0 (L) 07/11/2024    LABALBU 100 (H) 06/06/2024    BILITOT 0.5 07/11/2024    ALKPHOS 109 07/11/2024    AST 26 07/11/2024    ALT 29 07/11/2024    LABGLOM 13.6 (L) 07/11/2024    GFRAA 16.5 (L) 07/11/2024    AGRATIO 1.8 06/13/2024    GLOB 2.1 (L) 06/13/2024     Is He on ACE inhibitor or angiotensin II receptor blocker? Yes  Is He on Aspirin? Yes  Eye exam current (within one year): yes; last month  Foot exam current (within one year): no, s/p bilateral BKA  Is He Smoker? No  Did He receive influenza vaccine within the last 12 months? No, due now  Did He receive Pneumococcal vaccine? No, due now    Review of Systems   Constitutional:  Positive for diaphoresis (during hypoglycemic episode). Negative for chills, fatigue and fever.   Eyes:  Negative for pain.   Respiratory:  Negative for cough and shortness of breath.    Cardiovascular:  Negative for chest pain.   Gastrointestinal:  Negative for abdominal pain, constipation, diarrhea, nausea and vomiting.   Genitourinary:  Negative for dysuria, frequency and urgency.   Musculoskeletal:  Negative for back pain, myalgias and neck pain.   Skin:  Negative for rash and wound.   Neurological:  Negative for weakness and light-headedness.   Psychiatric/Behavioral:  The patient is not nervous/anxious.        Family History   Problem Relation Age of Onset    Diabetes Mother     Kidney Disease Mother     Cancer Father     Hypertension Father     Diabetes Father     Alcohol Abuse Father     Liver Disease Father        Social History     Socioeconomic History    Marital status:      Spouse name: Not on file    Number of children: Not on file    Years of education: Not on file    Highest education level: Not on file   Occupational History    Not on file   Tobacco Use    Smoking status: Never    Smokeless tobacco: Never   Vaping Use    Vaping status: Never Used   Substance and Sexual Activity    Alcohol use: Never    Drug use: Never    Sexual activity: Not on file   Other  his upcoming Medicare annual wellness visit.  I did also update his pneumonia vaccine today.  He is now completed the series.  I did review recommendations for other vaccinations such as the flu, shingles, tetanus, and RSV.  Patient is doing well with his current prosthesis and reports no injury or discomfort.  He will need additional supplies due to regular wear and tear.  This visit will serve as a face-to-face visit certifying this. He ambulates well with a cane and is able to perform his daily acts of living without significant difficulties.      His blood pressure is elevated in office today but his home blood pressures are within range.  Recommend continued monitoring at home.  Continue lisinopril, no changes made.  Heart failure is well-controlled on Lasix, Coreg, Imdur, atorvastatin, lisinopril, baby aspirin.  Continue no changes made today.  Patient follows with nephrology and hematology for his CKD stage V and anemia of renal disease respectively.  I will follow-up with him in 3 months to reassess his chronic conditions.    1. Controlled type 2 diabetes mellitus with hypoglycemia, with long-term current use of insulin ([facility])   - POCT A1c 6.0%   - Reduce nighttime basal insulin from 8 units to 5 units in consideration of hypoglycemia  2. Type 2 diabetes mellitus with stage 5 chronic kidney disease not on chronic dialysis, with long-term current use of insulin ([facility])  -     POCT glycosylated hemoglobin (Hb A1C) 6.0%  3. Hypertensive heart and kidney disease with chronic systolic congestive heart failure and stage 5 chronic kidney disease not on chronic dialysis ([facility])   -Mildly elevated in office; home blood pressure readings within normal range  -   Refill furosemide (LASIX) 40 MG tablet; Take 1 tablet by mouth See Admin Instructions 40/80 mg alternating days, Disp-135 tablet, R-0Normal  4. CKD (chronic kidney disease) stage 5, GFR less than 15 ml/min ([facility])  - Continue following with nephrology; continue

## 2024-08-26 ENCOUNTER — OFFICE VISIT (OUTPATIENT)
Age: 74
End: 2024-08-26
Payer: MEDICARE

## 2024-08-26 VITALS
BODY MASS INDEX: 23.79 KG/M2 | SYSTOLIC BLOOD PRESSURE: 163 MMHG | WEIGHT: 157 LBS | TEMPERATURE: 97.7 F | HEIGHT: 68 IN | DIASTOLIC BLOOD PRESSURE: 63 MMHG | HEART RATE: 70 BPM | RESPIRATION RATE: 14 BRPM

## 2024-08-26 VITALS
RESPIRATION RATE: 16 BRPM | TEMPERATURE: 97.4 F | HEART RATE: 71 BPM | DIASTOLIC BLOOD PRESSURE: 56 MMHG | BODY MASS INDEX: 23.87 KG/M2 | SYSTOLIC BLOOD PRESSURE: 152 MMHG | WEIGHT: 157 LBS

## 2024-08-26 DIAGNOSIS — Z78.9 NONSMOKER: ICD-10-CM

## 2024-08-26 DIAGNOSIS — Z89.512 HX OF BKA, LEFT (HCC): ICD-10-CM

## 2024-08-26 DIAGNOSIS — Z89.511 HX OF BKA, RIGHT (HCC): ICD-10-CM

## 2024-08-26 DIAGNOSIS — Z89.512 S/P BKA (BELOW KNEE AMPUTATION) BILATERAL (HCC): Primary | ICD-10-CM

## 2024-08-26 DIAGNOSIS — Z00.00 INITIAL MEDICARE ANNUAL WELLNESS VISIT: ICD-10-CM

## 2024-08-26 DIAGNOSIS — Z12.11 COLON CANCER SCREENING: ICD-10-CM

## 2024-08-26 DIAGNOSIS — Z00.00 MEDICARE ANNUAL WELLNESS VISIT, SUBSEQUENT: Primary | ICD-10-CM

## 2024-08-26 DIAGNOSIS — Z89.511 S/P BKA (BELOW KNEE AMPUTATION) BILATERAL (HCC): Primary | ICD-10-CM

## 2024-08-26 PROCEDURE — 1123F ACP DISCUSS/DSCN MKR DOCD: CPT | Performed by: PHARMACIST

## 2024-08-26 PROCEDURE — 1123F ACP DISCUSS/DSCN MKR DOCD: CPT | Performed by: FAMILY MEDICINE

## 2024-08-26 PROCEDURE — 99212 OFFICE O/P EST SF 10 MIN: CPT | Performed by: FAMILY MEDICINE

## 2024-08-26 PROCEDURE — G0439 PPPS, SUBSEQ VISIT: HCPCS | Performed by: PHARMACIST

## 2024-08-26 PROCEDURE — 99214 OFFICE O/P EST MOD 30 MIN: CPT

## 2024-08-26 RX ORDER — ACETAMINOPHEN 325 MG/1
TABLET ORAL EVERY 4 HOURS
COMMUNITY

## 2024-08-26 RX ORDER — AFLIBERCEPT 40 MG/ML
INJECTION, SOLUTION INTRAVITREAL
COMMUNITY

## 2024-08-26 RX ORDER — ERYTHROPOIETIN 2000 [IU]/ML
INJECTION, SOLUTION INTRAVENOUS; SUBCUTANEOUS
COMMUNITY

## 2024-08-26 ASSESSMENT — LIFESTYLE VARIABLES
HOW OFTEN DO YOU HAVE A DRINK CONTAINING ALCOHOL: NEVER
HOW MANY STANDARD DRINKS CONTAINING ALCOHOL DO YOU HAVE ON A TYPICAL DAY: PATIENT DOES NOT DRINK

## 2024-08-26 ASSESSMENT — ENCOUNTER SYMPTOMS
SHORTNESS OF BREATH: 0
COUGH: 0

## 2024-08-26 ASSESSMENT — PATIENT HEALTH QUESTIONNAIRE - PHQ9
SUM OF ALL RESPONSES TO PHQ9 QUESTIONS 1 & 2: 0
SUM OF ALL RESPONSES TO PHQ QUESTIONS 1-9: 0
1. LITTLE INTEREST OR PLEASURE IN DOING THINGS: NOT AT ALL
2. FEELING DOWN, DEPRESSED OR HOPELESS: NOT AT ALL
SUM OF ALL RESPONSES TO PHQ QUESTIONS 1-9: 0

## 2024-08-26 NOTE — PROGRESS NOTES
Dentist Screen:  Have you seen the dentist within the past year?: (!) No    Intervention:  Patient noted that he has some concerns about going to the dentist, but knows he needs to make an appointment.        ADL's:   Patient reports needing help with:  Select all that apply: (!) Banking/Finances, Transportation, Shopping  Interventions:  Patient has shared duties with his wife.                   Objective   Vitals:    08/26/24 1439   BP: (!) 170/60   Pulse: 70   Resp: 14   Temp: 97.7 °F (36.5 °C)   TempSrc: Oral   Weight: 71.2 kg (157 lb)   Height: 1.727 m (5' 8\")      Body mass index is 23.87 kg/m².                  No Known Allergies  Prior to Visit Medications    Medication Sig Taking? Authorizing Provider   acetaminophen (TYLENOL) 325 MG tablet every 4 hours Yes Jhon Rosas MD   epoetin katerina (PROCRIT) 2000 UNIT/ML injection as directed Injection every 2 weeks Yes Jhon Rosas MD   Aflibercept (EYLEA) 2 MG/0.05ML SOSY by Intravitreal route every 3 months INJ. Yes Jhon Rosas MD   calcium carbonate (TUMS EX) 750 MG chewable tablet Take 2 tablets by mouth 3 times daily With meals Yes Jhon Rosas MD   furosemide (LASIX) 40 MG tablet Take 1 tablet by mouth See Admin Instructions 40/80 mg alternating days Yes Zaida Tolbert MD   insulin glargine (BASAGLAR KWIKPEN) 100 UNIT/ML injection pen Inject 5 Units into the skin nightly Yes Jhon Rosas MD   blood glucose test strips (ASCENSIA AUTODISC VI;ONE TOUCH ULTRA TEST VI) strip 1 each by Other route daily Yes Zaida Tolbert MD   Lancets (ONETOUCH DELICA PLUS [id number]) MISC 1 each by Other route daily Yes Zaida Tolbert MD   dapagliflozin (FARXIGA) 10 MG tablet Take 1 tablet by mouth daily Yes Zaida Tolbert MD   BRILINTA 90 MG TABS tablet Take 1 tablet by mouth 2 times daily Yes Zaida Tolbert MD   ONETOUCH ULTRA strip Test 3 times a day Yes Zaida Tolbert MD   Blood Glucose Monitoring Suppl (ONE TOUCH ULTRA 2) w/Device KIT

## 2024-08-26 NOTE — PATIENT INSTRUCTIONS
solid fat--butter, margarine, and shortening--you eat. Use olive, peanut, or canola oil when you cook. Bake, broil, and steam foods instead of frying them.     Eat a variety of fruit and vegetables every day. Dark green, deep orange, red, or yellow fruits and vegetables are especially good for you. Examples include spinach, carrots, peaches, and berries.     Foods high in fiber can reduce your cholesterol and provide important vitamins and minerals. High-fiber foods include whole-grain cereals and breads, oatmeal, beans, brown rice, citrus fruits, and apples.     Eat lean proteins. Heart-healthy proteins include seafood, lean meats and poultry, eggs, beans, peas, nuts, seeds, and soy products.     Limit drinks and foods with added sugar. These include candy, desserts, and soda pop.   Heart-healthy lifestyle    If your doctor recommends it, get more exercise. For many people, walking is a good choice. Or you may want to swim, bike, or do other activities. Bit by bit, increase the time you're active every day. Try for at least 30 minutes on most days of the week.     Try to quit or cut back on using tobacco and other nicotine products. This includes smoking and vaping. If you need help quitting, talk to your doctor about stop-smoking programs and medicines. These can increase your chances of quitting for good. Quitting is one of the most important things you can do to protect your heart. It is never too late to quit. Try to avoid secondhand smoke too.     Stay at a weight that's healthy for you. Talk to your doctor if you need help losing weight.     Try to get 7 to 9 hours of sleep each night.     Limit alcohol to 2 drinks a day for men and 1 drink a day for women. Too much alcohol can cause health problems.     Manage other health problems such as diabetes, high blood pressure, and high cholesterol. If you think you may have a problem with alcohol or drug use, talk to your doctor.   Medicines    Take your medicines

## 2024-08-26 NOTE — PROGRESS NOTES
[facility]  [address] [place] [address]  Phone: [phone]  Fax: [phone]      Date of Visit:  2024  Patient Name: Arnold Shahid Jr.   Patient :  1950     Chief Complaint   Patient presents with    Other     Face to face for prosthetic supplies       HPI:     Arnold Shahid Jr. is a 74 y.o. male who presents today to discuss face to face for prosthetic supplies. He states that the prosthetic company states it is time for new supplies as the old ones are getting stretched out and need replacement. Otherwise no acute concerns today.       FACE TO FACE:  Arnold Shahid Jr. requires prosthetic supplies due to impaired ambulation s/p bilateral BKA and for increased stability in order to participate in or complete daily living tasks of: eating, bathing, toileting, personal cares, ambulating, grooming, hygiene, dressing upper body, dressing lower body, meal preparation, and taking own medications.  The patient is able to safely use the prosthetic supplies and the functional mobility deficit can be sufficiently improved.     I personally reviewed the patient's past medical history, current medications, allergies, surgical history, family history and social history.  Updates were made as necessary.    REVIEW OF SYSTEM      Review of Systems   Constitutional:  Negative for chills, diaphoresis, fatigue and fever.   Respiratory:  Negative for cough and shortness of breath.    Cardiovascular:  Negative for chest pain and palpitations.   Neurological:  Negative for dizziness, syncope, light-headedness and headaches.   Psychiatric/Behavioral:  Negative for dysphoric mood. The patient is not nervous/anxious.      REVIEWED INFORMATION      No Known Allergies    Patient Active Problem List   Diagnosis    Chronic systolic congestive heart failure (HCC)    Persistent proteinuria    Hypertensive heart disease with heart failure and stage 5 chronic kidney

## 2024-08-26 NOTE — PATIENT INSTRUCTIONS
Follow up as needed  Will fax over the form to the prosthesis company   Let me know if there are any further issues but a face to face that was required was completed today.   Please call the office if you have any questions or concerns  If you have not signed up for [employer] please sign up, you can reach out to me, see your lab results and request refills for medications    Appointments can be made via [employer] as well, with the standard coming 15 minutes prior to appointments  Thank you for your visit today!   Patient Education        A Healthy Lifestyle: Care Instructions  A healthy lifestyle can help you feel good, have more energy, and stay at a weight that's healthy for you. You can share a healthy lifestyle with your friends and family. And you can do it on your own.    Eat meals with your friends or family. You could try cooking together.   Plan activities with other people. Go for a walk with a friend, try a free online fitness class, or join a sports league.     Eat a variety of healthy foods. These include fruits, vegetables, whole grains, low-fat dairy, and lean protein.   Choose healthy portions of food. You can use the Nutrition Facts label on food packages as a guide.     Eat more fruits and vegetables. You could add vegetables to sandwiches or add fruit to cereal.   Drink water when you are thirsty. Limit soda, juice, and sports drinks.     Try to exercise most days. Aim for at least 2½ hours of exercise each week.   Keep moving. Work in the garden or take your dog on a walk. Use the stairs instead of the elevator.     If you use tobacco or nicotine, try to quit. Ask your doctor about programs and medicines to help you quit.   Limit alcohol. Men should have no more than 2 drinks a day. Women should have no more than 1. For some people, no alcohol is the best choice.   Follow-up care is a key part of your treatment and safety. Be sure to make and go to all appointments, and call your doctor if you are

## 2024-08-30 DIAGNOSIS — I13.2 HYPERTENSIVE HEART AND KIDNEY DISEASE WITH CHRONIC SYSTOLIC CONGESTIVE HEART FAILURE AND STAGE 5 CHRONIC KIDNEY DISEASE NOT ON CHRONIC DIALYSIS (HCC): ICD-10-CM

## 2024-08-30 DIAGNOSIS — I50.22 HYPERTENSIVE HEART AND KIDNEY DISEASE WITH CHRONIC SYSTOLIC CONGESTIVE HEART FAILURE AND STAGE 5 CHRONIC KIDNEY DISEASE NOT ON CHRONIC DIALYSIS (HCC): ICD-10-CM

## 2024-08-30 DIAGNOSIS — N18.5 HYPERTENSIVE HEART AND KIDNEY DISEASE WITH CHRONIC SYSTOLIC CONGESTIVE HEART FAILURE AND STAGE 5 CHRONIC KIDNEY DISEASE NOT ON CHRONIC DIALYSIS (HCC): ICD-10-CM

## 2024-08-30 DIAGNOSIS — I50.22 CHRONIC SYSTOLIC CONGESTIVE HEART FAILURE (HCC): ICD-10-CM

## 2024-08-30 RX ORDER — FUROSEMIDE 40 MG
40 TABLET ORAL SEE ADMIN INSTRUCTIONS
Qty: 135 TABLET | Refills: 0 | Status: SHIPPED | OUTPATIENT
Start: 2024-08-30

## 2024-08-30 NOTE — TELEPHONE ENCOUNTER
Patient requesting refill    Patient is out because the script was sent to the wrong pharmacy the first time

## 2024-09-30 ENCOUNTER — PATIENT MESSAGE (OUTPATIENT)
Age: 74
End: 2024-09-30

## 2024-11-29 DIAGNOSIS — Z79.4 TYPE 2 DIABETES MELLITUS WITH STAGE 5 CHRONIC KIDNEY DISEASE NOT ON CHRONIC DIALYSIS, WITH LONG-TERM CURRENT USE OF INSULIN (HCC): ICD-10-CM

## 2024-11-29 DIAGNOSIS — N18.5 TYPE 2 DIABETES MELLITUS WITH STAGE 5 CHRONIC KIDNEY DISEASE NOT ON CHRONIC DIALYSIS, WITH LONG-TERM CURRENT USE OF INSULIN (HCC): ICD-10-CM

## 2024-11-29 DIAGNOSIS — E11.22 TYPE 2 DIABETES MELLITUS WITH STAGE 5 CHRONIC KIDNEY DISEASE NOT ON CHRONIC DIALYSIS, WITH LONG-TERM CURRENT USE OF INSULIN (HCC): ICD-10-CM

## 2024-11-29 RX ORDER — LANCETS 33 GAUGE
EACH MISCELLANEOUS
Qty: 100 EACH | Refills: 2 | Status: SHIPPED | OUTPATIENT
Start: 2024-11-29

## 2024-12-19 DIAGNOSIS — N18.5 HYPERTENSIVE HEART AND KIDNEY DISEASE WITH CHRONIC SYSTOLIC CONGESTIVE HEART FAILURE AND STAGE 5 CHRONIC KIDNEY DISEASE NOT ON CHRONIC DIALYSIS (HCC): ICD-10-CM

## 2024-12-19 DIAGNOSIS — I50.22 CHRONIC SYSTOLIC CONGESTIVE HEART FAILURE (HCC): ICD-10-CM

## 2024-12-19 DIAGNOSIS — I50.22 HYPERTENSIVE HEART AND KIDNEY DISEASE WITH CHRONIC SYSTOLIC CONGESTIVE HEART FAILURE AND STAGE 5 CHRONIC KIDNEY DISEASE NOT ON CHRONIC DIALYSIS (HCC): ICD-10-CM

## 2024-12-19 DIAGNOSIS — I13.2 HYPERTENSIVE HEART AND KIDNEY DISEASE WITH CHRONIC SYSTOLIC CONGESTIVE HEART FAILURE AND STAGE 5 CHRONIC KIDNEY DISEASE NOT ON CHRONIC DIALYSIS (HCC): ICD-10-CM

## 2024-12-20 RX ORDER — FUROSEMIDE 40 MG/1
TABLET ORAL
Qty: 135 TABLET | Refills: 0 | Status: SHIPPED | OUTPATIENT
Start: 2024-12-20

## 2024-12-30 DIAGNOSIS — Z79.4 TYPE 2 DIABETES MELLITUS WITH STAGE 5 CHRONIC KIDNEY DISEASE NOT ON CHRONIC DIALYSIS, WITH LONG-TERM CURRENT USE OF INSULIN (HCC): Primary | ICD-10-CM

## 2024-12-30 DIAGNOSIS — N18.5 TYPE 2 DIABETES MELLITUS WITH STAGE 5 CHRONIC KIDNEY DISEASE NOT ON CHRONIC DIALYSIS, WITH LONG-TERM CURRENT USE OF INSULIN (HCC): Primary | ICD-10-CM

## 2024-12-30 DIAGNOSIS — E11.22 TYPE 2 DIABETES MELLITUS WITH STAGE 5 CHRONIC KIDNEY DISEASE NOT ON CHRONIC DIALYSIS, WITH LONG-TERM CURRENT USE OF INSULIN (HCC): Primary | ICD-10-CM

## 2024-12-30 RX ORDER — INSULIN GLARGINE 100 [IU]/ML
INJECTION, SOLUTION SUBCUTANEOUS
Qty: 15 ML | Refills: 0 | Status: SHIPPED | OUTPATIENT
Start: 2024-12-30

## 2025-01-08 DIAGNOSIS — I50.22 CHRONIC SYSTOLIC CONGESTIVE HEART FAILURE (HCC): ICD-10-CM

## 2025-01-08 DIAGNOSIS — I50.22 HYPERTENSIVE HEART AND KIDNEY DISEASE WITH CHRONIC SYSTOLIC CONGESTIVE HEART FAILURE AND STAGE 5 CHRONIC KIDNEY DISEASE NOT ON CHRONIC DIALYSIS (HCC): ICD-10-CM

## 2025-01-08 DIAGNOSIS — N18.5 HYPERTENSIVE HEART AND KIDNEY DISEASE WITH CHRONIC SYSTOLIC CONGESTIVE HEART FAILURE AND STAGE 5 CHRONIC KIDNEY DISEASE NOT ON CHRONIC DIALYSIS (HCC): ICD-10-CM

## 2025-01-08 DIAGNOSIS — I13.2 HYPERTENSIVE HEART AND KIDNEY DISEASE WITH CHRONIC SYSTOLIC CONGESTIVE HEART FAILURE AND STAGE 5 CHRONIC KIDNEY DISEASE NOT ON CHRONIC DIALYSIS (HCC): ICD-10-CM

## 2025-01-08 RX ORDER — FUROSEMIDE 40 MG/1
TABLET ORAL
Qty: 135 TABLET | Refills: 0 | Status: SHIPPED | OUTPATIENT
Start: 2025-01-08

## 2025-01-13 DIAGNOSIS — N18.5 TYPE 2 DIABETES MELLITUS WITH STAGE 5 CHRONIC KIDNEY DISEASE NOT ON CHRONIC DIALYSIS, WITH LONG-TERM CURRENT USE OF INSULIN (HCC): ICD-10-CM

## 2025-01-13 DIAGNOSIS — Z79.4 TYPE 2 DIABETES MELLITUS WITH STAGE 5 CHRONIC KIDNEY DISEASE NOT ON CHRONIC DIALYSIS, WITH LONG-TERM CURRENT USE OF INSULIN (HCC): ICD-10-CM

## 2025-01-13 DIAGNOSIS — E11.22 TYPE 2 DIABETES MELLITUS WITH STAGE 5 CHRONIC KIDNEY DISEASE NOT ON CHRONIC DIALYSIS, WITH LONG-TERM CURRENT USE OF INSULIN (HCC): ICD-10-CM

## 2025-01-13 RX ORDER — LANCETS 33 GAUGE
1 EACH MISCELLANEOUS 3 TIMES DAILY
Qty: 100 EACH | Refills: 3 | Status: SHIPPED | OUTPATIENT
Start: 2025-01-13

## 2025-01-13 RX ORDER — UBIQUINOL 100 MG
1 CAPSULE ORAL 3 TIMES DAILY
Qty: 200 EACH | Refills: 3 | Status: SHIPPED | OUTPATIENT
Start: 2025-01-13

## 2025-01-15 DIAGNOSIS — I50.22 CHRONIC SYSTOLIC CONGESTIVE HEART FAILURE (HCC): ICD-10-CM

## 2025-01-15 RX ORDER — DAPAGLIFLOZIN 10 MG/1
10 TABLET, FILM COATED ORAL DAILY
Qty: 90 TABLET | Refills: 3 | Status: SHIPPED | OUTPATIENT
Start: 2025-01-15

## 2025-01-15 RX ORDER — TICAGRELOR 90 MG/1
90 TABLET ORAL 2 TIMES DAILY
Qty: 180 TABLET | Refills: 4 | Status: SHIPPED | OUTPATIENT
Start: 2025-01-15

## 2025-01-15 NOTE — TELEPHONE ENCOUNTER
Patient needs new yearly script for Farxiga and Brilinta sent to [facility] which supplies his patient assistance.  Pended for you.  As an aside, does he still need the Brilinta 90 mg BID?  Looks like there's consideration for lower the dose in [facility] depending on indication and length of therapy. Maybe see what your thoughts/cardiology's thoughts?

## 2025-04-04 ENCOUNTER — TELEPHONE (OUTPATIENT)
Age: 75
End: 2025-04-04

## 2025-04-08 DIAGNOSIS — E11.22 TYPE 2 DIABETES MELLITUS WITH STAGE 5 CHRONIC KIDNEY DISEASE NOT ON CHRONIC DIALYSIS, WITH LONG-TERM CURRENT USE OF INSULIN (HCC): ICD-10-CM

## 2025-04-08 DIAGNOSIS — N18.5 TYPE 2 DIABETES MELLITUS WITH STAGE 5 CHRONIC KIDNEY DISEASE NOT ON CHRONIC DIALYSIS, WITH LONG-TERM CURRENT USE OF INSULIN (HCC): ICD-10-CM

## 2025-04-08 DIAGNOSIS — Z79.4 TYPE 2 DIABETES MELLITUS WITH STAGE 5 CHRONIC KIDNEY DISEASE NOT ON CHRONIC DIALYSIS, WITH LONG-TERM CURRENT USE OF INSULIN (HCC): ICD-10-CM

## 2025-04-08 RX ORDER — INSULIN GLARGINE 100 [IU]/ML
INJECTION, SOLUTION SUBCUTANEOUS
Qty: 15 ML | Refills: 0 | Status: SHIPPED | OUTPATIENT
Start: 2025-04-08